# Patient Record
Sex: MALE | Race: WHITE | NOT HISPANIC OR LATINO | Employment: OTHER | URBAN - METROPOLITAN AREA
[De-identification: names, ages, dates, MRNs, and addresses within clinical notes are randomized per-mention and may not be internally consistent; named-entity substitution may affect disease eponyms.]

---

## 2023-02-17 ENCOUNTER — TELEPHONE (OUTPATIENT)
Dept: GASTROENTEROLOGY | Facility: CLINIC | Age: 88
End: 2023-02-17
Payer: MEDICARE

## 2023-02-17 NOTE — TELEPHONE ENCOUNTER
Called PCP's office to see if I could get records on last EGD from 2020 and Colonoscopy 2014 noted on referral. They do not have records. I called pt, no answer, left vm asking for a returned call.

## 2023-02-21 ENCOUNTER — OFFICE VISIT (OUTPATIENT)
Dept: GASTROENTEROLOGY | Facility: CLINIC | Age: 88
End: 2023-02-21
Payer: MEDICARE

## 2023-02-21 VITALS
HEART RATE: 72 BPM | BODY MASS INDEX: 20.05 KG/M2 | SYSTOLIC BLOOD PRESSURE: 111 MMHG | WEIGHT: 156.2 LBS | DIASTOLIC BLOOD PRESSURE: 71 MMHG | HEIGHT: 74 IN

## 2023-02-21 DIAGNOSIS — K22.70 BARRETT'S ESOPHAGUS WITHOUT DYSPLASIA: Primary | ICD-10-CM

## 2023-02-21 DIAGNOSIS — E73.9: ICD-10-CM

## 2023-02-21 PROCEDURE — 99203 OFFICE O/P NEW LOW 30 MIN: CPT | Performed by: NURSE PRACTITIONER

## 2023-02-21 RX ORDER — OMEPRAZOLE 20 MG/1
CAPSULE, DELAYED RELEASE ORAL
COMMUNITY
Start: 2023-02-03

## 2023-02-21 RX ORDER — LATANOPROST 50 UG/ML
SOLUTION/ DROPS OPHTHALMIC
COMMUNITY
Start: 2023-02-11

## 2023-02-21 RX ORDER — KETOCONAZOLE 20 MG/ML
SHAMPOO TOPICAL
COMMUNITY

## 2023-02-21 RX ORDER — TAMSULOSIN HYDROCHLORIDE 0.4 MG/1
CAPSULE ORAL
COMMUNITY

## 2023-02-21 RX ORDER — MAGNESIUM HYDROXIDE 1200 MG/15ML
SUSPENSION ORAL
COMMUNITY
Start: 2022-12-06

## 2023-02-21 RX ORDER — HYDROCODONE BITARTRATE AND ACETAMINOPHEN 7.5; 325 MG/1; MG/1
TABLET ORAL EVERY 6 HOURS
COMMUNITY

## 2023-02-21 RX ORDER — CALCIUM CARBONATE 750 MG/1
750 TABLET, CHEWABLE ORAL DAILY
COMMUNITY

## 2023-02-21 RX ORDER — DULOXETIN HYDROCHLORIDE 30 MG/1
1 CAPSULE, DELAYED RELEASE ORAL DAILY
COMMUNITY
Start: 2023-02-03

## 2023-02-21 RX ORDER — FLUTICASONE PROPIONATE 50 MCG
2 SPRAY, SUSPENSION (ML) NASAL DAILY
COMMUNITY

## 2023-02-21 RX ORDER — MIRTAZAPINE 15 MG/1
15 TABLET, FILM COATED ORAL
COMMUNITY
Start: 2023-02-13

## 2023-02-21 RX ORDER — ACETAMINOPHEN 325 MG/1
650 TABLET ORAL EVERY 6 HOURS PRN
COMMUNITY

## 2023-02-21 NOTE — PROGRESS NOTES
Patient Name: Ish Chavarria   Visit Date: 02/21/2023   Patient ID: [id number]  Provider: SABIHA Fong    Sex: male  Location:  Location Address:  Location Phone: [phone]  [place] [address] [phone]    YOB: 1935  Age: 87 y.o.   Primary Care Provider Akila Florence APRN      Referring Provider: SABIHA Al        Chief Complaint  Pineda's Esophagus  (Last EGD 2020)    History of Present Illness    New pt presents w hx of Pineda's, dx at least 10 yrs ago. No HB w Omeprazole. No dysphagia. Fair appetite, living in assisted living and doesn't always like the meals.  No wt loss.   Pt states he had some diarrhea, attributed to 2 Ensure/d, has also noticed w milk. No blood in stool or black stool. Stools are now back to normal since he stopped Ensure.     Last EGD around 2020 in [place] - 6 cm Pineda's on Opnote on phone of family, no path report available today.  Pt denies any hx of advanced procedures or known dysplasia.  NO ETOH currently, social quit about 1 yr ago.   Past Medical History:   Diagnosis Date   • Pineda's esophagus    • Basal cell carcinoma    • Compression fracture of first lumbar vertebra (HCC) 2022    L1, L2, L5   • Depression    • GERD (gastroesophageal reflux disease)    • Glaucoma    • Hiatal hernia    • History of BPH    • Hyperlipidemia    • Idiopathic peripheral neuropathy 2009   • Nonrheumatic aortic valve stenosis    • Osteoarthritis    • Osteoporosis    • Periodontal disease    • Pneumothorax    • S/P kyphoplasty     L5       Past Surgical History:   Procedure Laterality Date   • APPENDECTOMY  1953   • CATARACT EXTRACTION  2015   • CHOLECYSTECTOMY  2000   • COLONOSCOPY  09/2014   • ENDOSCOPY  01/2020    Pineda's Esophagitis   • TONSILLECTOMY AND ADENOIDECTOMY         Allergies   Allergen Reactions   • Gabapentin Unknown - Low Severity   • Lovastatin Unknown - Low Severity   • Lyrica [Pregabalin] Unknown - Low Severity   • Nsaids GI Intolerance   •  "Paroxetine Unknown - Low Severity       Family History   Problem Relation Age of Onset   • Colon cancer Neg Hx         Social History     Tobacco Use   • Smoking status: Former     Types: Cigarettes     Quit date: 1965     Years since quittin.1   • Smokeless tobacco: Never   Vaping Use   • Vaping Use: Never used   Substance Use Topics   • Alcohol use: Not Currently   • Drug use: Never       Objective     Vital Signs:   /71 (BP Location: Right arm, Patient Position: Sitting, Cuff Size: Adult)   Pulse 72   Ht 188 cm (74\")   Wt 70.9 kg (156 lb 3.2 oz)   BMI 20.05 kg/m²       Physical Exam  Constitutional:       General: The patient is not in acute distress.     Appearance: Normal appearance.   HENT:      Head: Normocephalic and atraumatic.      Nose: Nose normal.   Pulmonary:      Effort: Pulmonary effort is normal. No respiratory distress.   Abdominal:      General: Abdomen is flat. Pt has prominence at end of sternum, pt states he \"has always been that way.\"      Palpations: Abdomen is soft. There is no mass.   Tenderness: There is no abdominal tenderness. There is no guarding.   Musculoskeletal:      Cervical back: Neck supple.      Right lower leg: No edema.      Left lower leg: No edema.   Skin:     General: Skin is warm and dry.   Neurological:      General: No focal deficit present.      Mental Status: The patient is alert and oriented to person, place, and time.      Gait: uses walker  Psychiatric:         Mood and Affect: Mood normal.         Speech: Speech normal.         Behavior: Behavior normal.         Thought Content: Thought content normal.     Result Review :   The following data was reviewed by: SABIHA Fong on 2023:                        Assessment and Plan    Diagnoses and all orders for this visit:    1. Pineda's esophagus without dysplasia (Primary)    2. Late-onset lactose intolerance  Comments:  per reported history              Follow Up   Return if " symptoms worsen or fail to improve.   Discussed with patient that we generally stop screening after age 85 and as he is asymptomatic he was agreeable to this.  We did discuss the long-term risk of PPI therapy but with Pineda's history, recommend he continue.  He is able to be symptom-free with low-dose omeprazole.  I suggested Florajen or VSL #3 probiotic daily  Recommended avoiding lactose containing products, if any loose stools were to return or persist requested he return to the office.  Call or return to clinic PRN if any change in bowel pattern, blood in stool, or new GI sx.       Patient was given instructions and counseling regarding his condition or for health maintenance advice. Please see specific information pulled into the AVS if appropriate.

## 2023-12-24 ENCOUNTER — HOSPITAL ENCOUNTER (EMERGENCY)
Facility: HOSPITAL | Age: 88
Discharge: HOME | End: 2023-12-24
Attending: EMERGENCY MEDICINE | Admitting: EMERGENCY MEDICINE
Payer: MEDICARE

## 2023-12-24 ENCOUNTER — APPOINTMENT (OUTPATIENT)
Dept: GENERAL RADIOLOGY | Facility: HOSPITAL | Age: 88
End: 2023-12-24
Payer: MEDICARE

## 2023-12-24 VITALS
WEIGHT: 175.71 LBS | BODY MASS INDEX: 22.55 KG/M2 | SYSTOLIC BLOOD PRESSURE: 132 MMHG | TEMPERATURE: 97.8 F | DIASTOLIC BLOOD PRESSURE: 88 MMHG | RESPIRATION RATE: 16 BRPM | OXYGEN SATURATION: 96 % | HEART RATE: 60 BPM | HEIGHT: 74 IN

## 2023-12-24 DIAGNOSIS — K21.00 GASTROESOPHAGEAL REFLUX DISEASE WITH ESOPHAGITIS WITHOUT HEMORRHAGE: Primary | ICD-10-CM

## 2023-12-24 LAB
ALBUMIN SERPL-MCNC: 4.4 G/DL (ref 3.5–5.2)
ALBUMIN/GLOB SERPL: 1.2 G/DL
ALP SERPL-CCNC: 95 U/L (ref 39–117)
ALT SERPL W P-5'-P-CCNC: 33 U/L (ref 1–41)
ANION GAP SERPL CALCULATED.3IONS-SCNC: 10.6 MMOL/L (ref 5–15)
AST SERPL-CCNC: 24 U/L (ref 1–40)
BASOPHILS # BLD AUTO: 0.06 10*3/MM3 (ref 0–0.2)
BASOPHILS NFR BLD AUTO: 0.7 % (ref 0–1.5)
BILIRUB SERPL-MCNC: 0.7 MG/DL (ref 0–1.2)
BUN SERPL-MCNC: 14 MG/DL (ref 8–23)
BUN/CREAT SERPL: 12.5 (ref 7–25)
CALCIUM SPEC-SCNC: 9.5 MG/DL (ref 8.6–10.5)
CHLORIDE SERPL-SCNC: 100 MMOL/L (ref 98–107)
CO2 SERPL-SCNC: 25.4 MMOL/L (ref 22–29)
CREAT SERPL-MCNC: 1.12 MG/DL (ref 0.76–1.27)
D DIMER PPP FEU-MCNC: 0.76 MCGFEU/ML (ref 0–0.88)
DEPRECATED RDW RBC AUTO: 45.7 FL (ref 37–54)
EGFRCR SERPLBLD CKD-EPI 2021: 63.2 ML/MIN/1.73
EOSINOPHIL # BLD AUTO: 0.11 10*3/MM3 (ref 0–0.4)
EOSINOPHIL NFR BLD AUTO: 1.2 % (ref 0.3–6.2)
ERYTHROCYTE [DISTWIDTH] IN BLOOD BY AUTOMATED COUNT: 12.8 % (ref 12.3–15.4)
FLUAV SUBTYP SPEC NAA+PROBE: NOT DETECTED
FLUBV RNA ISLT QL NAA+PROBE: NOT DETECTED
GEN 5 2HR TROPONIN T REFLEX: 11 NG/L
GLOBULIN UR ELPH-MCNC: 3.6 GM/DL
GLUCOSE SERPL-MCNC: 118 MG/DL (ref 65–99)
HCT VFR BLD AUTO: 46.1 % (ref 37.5–51)
HGB BLD-MCNC: 15.3 G/DL (ref 13–17.7)
HOLD SPECIMEN: NORMAL
HOLD SPECIMEN: NORMAL
IMM GRANULOCYTES # BLD AUTO: 0.03 10*3/MM3 (ref 0–0.05)
IMM GRANULOCYTES NFR BLD AUTO: 0.3 % (ref 0–0.5)
LIPASE SERPL-CCNC: 27 U/L (ref 13–60)
LYMPHOCYTES # BLD AUTO: 2.02 10*3/MM3 (ref 0.7–3.1)
LYMPHOCYTES NFR BLD AUTO: 22.9 % (ref 19.6–45.3)
MAGNESIUM SERPL-MCNC: 2.3 MG/DL (ref 1.6–2.4)
MCH RBC QN AUTO: 32.1 PG (ref 26.6–33)
MCHC RBC AUTO-ENTMCNC: 33.2 G/DL (ref 31.5–35.7)
MCV RBC AUTO: 96.6 FL (ref 79–97)
MONOCYTES # BLD AUTO: 0.52 10*3/MM3 (ref 0.1–0.9)
MONOCYTES NFR BLD AUTO: 5.9 % (ref 5–12)
NEUTROPHILS NFR BLD AUTO: 6.08 10*3/MM3 (ref 1.7–7)
NEUTROPHILS NFR BLD AUTO: 69 % (ref 42.7–76)
NRBC BLD AUTO-RTO: 0 /100 WBC (ref 0–0.2)
NT-PROBNP SERPL-MCNC: 79 PG/ML (ref 0–1800)
PLATELET # BLD AUTO: 222 10*3/MM3 (ref 140–450)
PMV BLD AUTO: 9.7 FL (ref 6–12)
POTASSIUM SERPL-SCNC: 4.4 MMOL/L (ref 3.5–5.2)
PROT SERPL-MCNC: 8 G/DL (ref 6–8.5)
QT INTERVAL: 384 MS
QT INTERVAL: 384 MS
QTC INTERVAL: 392 MS
QTC INTERVAL: 420 MS
RBC # BLD AUTO: 4.77 10*6/MM3 (ref 4.14–5.8)
RSV RNA NPH QL NAA+NON-PROBE: NOT DETECTED
SARS-COV-2 RNA RESP QL NAA+PROBE: DETECTED
SODIUM SERPL-SCNC: 136 MMOL/L (ref 136–145)
TROPONIN T DELTA: 0 NG/L
TROPONIN T SERPL HS-MCNC: 11 NG/L
WBC NRBC COR # BLD AUTO: 8.82 10*3/MM3 (ref 3.4–10.8)
WHOLE BLOOD HOLD COAG: NORMAL
WHOLE BLOOD HOLD SPECIMEN: NORMAL

## 2023-12-24 PROCEDURE — 99284 EMERGENCY DEPT VISIT MOD MDM: CPT

## 2023-12-24 PROCEDURE — 93005 ELECTROCARDIOGRAM TRACING: CPT | Performed by: EMERGENCY MEDICINE

## 2023-12-24 PROCEDURE — 93005 ELECTROCARDIOGRAM TRACING: CPT

## 2023-12-24 PROCEDURE — 87637 SARSCOV2&INF A&B&RSV AMP PRB: CPT | Performed by: EMERGENCY MEDICINE

## 2023-12-24 PROCEDURE — 83880 ASSAY OF NATRIURETIC PEPTIDE: CPT | Performed by: EMERGENCY MEDICINE

## 2023-12-24 PROCEDURE — 84484 ASSAY OF TROPONIN QUANT: CPT | Performed by: EMERGENCY MEDICINE

## 2023-12-24 PROCEDURE — 36415 COLL VENOUS BLD VENIPUNCTURE: CPT

## 2023-12-24 PROCEDURE — 96374 THER/PROPH/DIAG INJ IV PUSH: CPT

## 2023-12-24 PROCEDURE — 83690 ASSAY OF LIPASE: CPT | Performed by: EMERGENCY MEDICINE

## 2023-12-24 PROCEDURE — 83735 ASSAY OF MAGNESIUM: CPT | Performed by: EMERGENCY MEDICINE

## 2023-12-24 PROCEDURE — 71045 X-RAY EXAM CHEST 1 VIEW: CPT

## 2023-12-24 PROCEDURE — 80053 COMPREHEN METABOLIC PANEL: CPT | Performed by: EMERGENCY MEDICINE

## 2023-12-24 PROCEDURE — 85025 COMPLETE CBC W/AUTO DIFF WBC: CPT | Performed by: EMERGENCY MEDICINE

## 2023-12-24 PROCEDURE — 85379 FIBRIN DEGRADATION QUANT: CPT | Performed by: EMERGENCY MEDICINE

## 2023-12-24 RX ORDER — SODIUM CHLORIDE 0.9 % (FLUSH) 0.9 %
10 SYRINGE (ML) INJECTION AS NEEDED
Status: DISCONTINUED | OUTPATIENT
Start: 2023-12-24 | End: 2023-12-24 | Stop reason: HOSPADM

## 2023-12-24 RX ORDER — ASPIRIN 81 MG/1
324 TABLET, CHEWABLE ORAL ONCE
Status: DISCONTINUED | OUTPATIENT
Start: 2023-12-24 | End: 2023-12-24 | Stop reason: HOSPADM

## 2023-12-24 RX ORDER — PANTOPRAZOLE SODIUM 40 MG/10ML
40 INJECTION, POWDER, LYOPHILIZED, FOR SOLUTION INTRAVENOUS
Status: DISCONTINUED | OUTPATIENT
Start: 2023-12-25 | End: 2023-12-24

## 2023-12-24 RX ORDER — PANTOPRAZOLE SODIUM 40 MG/10ML
40 INJECTION, POWDER, LYOPHILIZED, FOR SOLUTION INTRAVENOUS ONCE
Status: COMPLETED | OUTPATIENT
Start: 2023-12-24 | End: 2023-12-24

## 2023-12-24 RX ORDER — DULOXETIN HYDROCHLORIDE 60 MG/1
60 CAPSULE, DELAYED RELEASE ORAL DAILY
COMMUNITY

## 2023-12-24 RX ADMIN — PANTOPRAZOLE SODIUM 40 MG: 40 INJECTION, POWDER, FOR SOLUTION INTRAVENOUS at 12:50

## 2023-12-24 NOTE — DISCHARGE INSTRUCTIONS
Increase your omeprazole to twice daily.  Avoid caffeine after noon.  If tolerable sleep with the head of the bed slightly elevated.  Return for worsening chest pain or other concerns.

## 2024-01-01 ENCOUNTER — APPOINTMENT (OUTPATIENT)
Dept: GENERAL RADIOLOGY | Facility: HOSPITAL | Age: 89
DRG: 177 | End: 2024-01-01
Payer: MEDICARE

## 2024-01-01 ENCOUNTER — HOSPITAL ENCOUNTER (INPATIENT)
Facility: HOSPITAL | Age: 89
LOS: 2 days | DRG: 177 | End: 2024-03-10
Attending: EMERGENCY MEDICINE | Admitting: INTERNAL MEDICINE
Payer: MEDICARE

## 2024-01-01 VITALS
RESPIRATION RATE: 30 BRPM | SYSTOLIC BLOOD PRESSURE: 82 MMHG | TEMPERATURE: 99.3 F | HEART RATE: 97 BPM | BODY MASS INDEX: 21.79 KG/M2 | DIASTOLIC BLOOD PRESSURE: 57 MMHG | WEIGHT: 169.75 LBS | OXYGEN SATURATION: 63 % | HEIGHT: 74 IN

## 2024-01-01 DIAGNOSIS — J69.0 ASPIRATION PNEUMONIA OF RIGHT LUNG, UNSPECIFIED ASPIRATION PNEUMONIA TYPE, UNSPECIFIED PART OF LUNG: ICD-10-CM

## 2024-01-01 DIAGNOSIS — E86.0 DEHYDRATION: Primary | ICD-10-CM

## 2024-01-01 LAB
ALBUMIN SERPL-MCNC: 3.1 G/DL (ref 3.5–5.2)
ALBUMIN/GLOB SERPL: 0.9 G/DL
ALP SERPL-CCNC: 99 U/L (ref 39–117)
ALT SERPL W P-5'-P-CCNC: 44 U/L (ref 1–41)
ANION GAP SERPL CALCULATED.3IONS-SCNC: 11.4 MMOL/L (ref 5–15)
AST SERPL-CCNC: 54 U/L (ref 1–40)
BACTERIA UR QL AUTO: ABNORMAL /HPF
BASOPHILS # BLD AUTO: 0.04 10*3/MM3 (ref 0–0.2)
BASOPHILS NFR BLD AUTO: 0.3 % (ref 0–1.5)
BILIRUB SERPL-MCNC: 2 MG/DL (ref 0–1.2)
BILIRUB UR QL STRIP: ABNORMAL
BUN SERPL-MCNC: 27 MG/DL (ref 8–23)
BUN/CREAT SERPL: 33.8 (ref 7–25)
CALCIUM SPEC-SCNC: 8.4 MG/DL (ref 8.6–10.5)
CHLORIDE SERPL-SCNC: 110 MMOL/L (ref 98–107)
CLARITY UR: CLEAR
CO2 SERPL-SCNC: 22.6 MMOL/L (ref 22–29)
COLOR UR: ABNORMAL
CREAT SERPL-MCNC: 0.8 MG/DL (ref 0.76–1.27)
D-LACTATE SERPL-SCNC: 1.3 MMOL/L (ref 0.5–2)
DEPRECATED RDW RBC AUTO: 50.6 FL (ref 37–54)
EGFRCR SERPLBLD CKD-EPI 2021: 85.1 ML/MIN/1.73
EOSINOPHIL # BLD AUTO: 0.02 10*3/MM3 (ref 0–0.4)
EOSINOPHIL NFR BLD AUTO: 0.1 % (ref 0.3–6.2)
ERYTHROCYTE [DISTWIDTH] IN BLOOD BY AUTOMATED COUNT: 14 % (ref 12.3–15.4)
GLOBULIN UR ELPH-MCNC: 3.6 GM/DL
GLUCOSE SERPL-MCNC: 117 MG/DL (ref 65–99)
GLUCOSE UR STRIP-MCNC: NEGATIVE MG/DL
HCT VFR BLD AUTO: 32.1 % (ref 37.5–51)
HGB BLD-MCNC: 10.5 G/DL (ref 13–17.7)
HGB UR QL STRIP.AUTO: NEGATIVE
HYALINE CASTS UR QL AUTO: ABNORMAL /LPF
IMM GRANULOCYTES # BLD AUTO: 0.14 10*3/MM3 (ref 0–0.05)
IMM GRANULOCYTES NFR BLD AUTO: 1 % (ref 0–0.5)
KETONES UR QL STRIP: ABNORMAL
LEUKOCYTE ESTERASE UR QL STRIP.AUTO: ABNORMAL
LYMPHOCYTES # BLD AUTO: 1.38 10*3/MM3 (ref 0.7–3.1)
LYMPHOCYTES NFR BLD AUTO: 10.1 % (ref 19.6–45.3)
MCH RBC QN AUTO: 32.4 PG (ref 26.6–33)
MCHC RBC AUTO-ENTMCNC: 32.7 G/DL (ref 31.5–35.7)
MCV RBC AUTO: 99.1 FL (ref 79–97)
MONOCYTES # BLD AUTO: 1.04 10*3/MM3 (ref 0.1–0.9)
MONOCYTES NFR BLD AUTO: 7.6 % (ref 5–12)
MRSA DNA SPEC QL NAA+PROBE: ABNORMAL
NEUTROPHILS NFR BLD AUTO: 11.09 10*3/MM3 (ref 1.7–7)
NEUTROPHILS NFR BLD AUTO: 80.9 % (ref 42.7–76)
NITRITE UR QL STRIP: NEGATIVE
NRBC BLD AUTO-RTO: 0 /100 WBC (ref 0–0.2)
PH UR STRIP.AUTO: 6 [PH] (ref 5–8)
PLATELET # BLD AUTO: 346 10*3/MM3 (ref 140–450)
PMV BLD AUTO: 9.7 FL (ref 6–12)
POTASSIUM SERPL-SCNC: 3.5 MMOL/L (ref 3.5–5.2)
PROCALCITONIN SERPL-MCNC: 0.2 NG/ML (ref 0–0.25)
PROT SERPL-MCNC: 6.7 G/DL (ref 6–8.5)
PROT UR QL STRIP: ABNORMAL
RBC # BLD AUTO: 3.24 10*6/MM3 (ref 4.14–5.8)
RBC # UR STRIP: ABNORMAL /HPF
REF LAB TEST METHOD: ABNORMAL
SODIUM SERPL-SCNC: 144 MMOL/L (ref 136–145)
SP GR UR STRIP: 1.03 (ref 1–1.03)
SQUAMOUS #/AREA URNS HPF: ABNORMAL /HPF
UROBILINOGEN UR QL STRIP: ABNORMAL
WBC # UR STRIP: ABNORMAL /HPF
WBC NRBC COR # BLD AUTO: 13.71 10*3/MM3 (ref 3.4–10.8)

## 2024-01-01 PROCEDURE — 99232 SBSQ HOSP IP/OBS MODERATE 35: CPT | Performed by: INTERNAL MEDICINE

## 2024-01-01 PROCEDURE — 84145 PROCALCITONIN (PCT): CPT | Performed by: INTERNAL MEDICINE

## 2024-01-01 PROCEDURE — 99285 EMERGENCY DEPT VISIT HI MDM: CPT

## 2024-01-01 PROCEDURE — 71045 X-RAY EXAM CHEST 1 VIEW: CPT

## 2024-01-01 PROCEDURE — 25010000002 VANCOMYCIN 5 G RECONSTITUTED SOLUTION: Performed by: EMERGENCY MEDICINE

## 2024-01-01 PROCEDURE — 93005 ELECTROCARDIOGRAM TRACING: CPT | Performed by: INTERNAL MEDICINE

## 2024-01-01 PROCEDURE — 25010000002 HALOPERIDOL LACTATE PER 5 MG: Performed by: PHYSICIAN ASSISTANT

## 2024-01-01 PROCEDURE — 36415 COLL VENOUS BLD VENIPUNCTURE: CPT | Performed by: EMERGENCY MEDICINE

## 2024-01-01 PROCEDURE — 85025 COMPLETE CBC W/AUTO DIFF WBC: CPT | Performed by: EMERGENCY MEDICINE

## 2024-01-01 PROCEDURE — 73502 X-RAY EXAM HIP UNI 2-3 VIEWS: CPT

## 2024-01-01 PROCEDURE — 25010000002 AMPICILLIN-SULBACTAM PER 1.5 G: Performed by: INTERNAL MEDICINE

## 2024-01-01 PROCEDURE — 25010000002 DIAZEPAM PER 5 MG: Performed by: PHYSICIAN ASSISTANT

## 2024-01-01 PROCEDURE — 25010000002 MORPHINE PER 10 MG: Performed by: STUDENT IN AN ORGANIZED HEALTH CARE EDUCATION/TRAINING PROGRAM

## 2024-01-01 PROCEDURE — 83605 ASSAY OF LACTIC ACID: CPT | Performed by: EMERGENCY MEDICINE

## 2024-01-01 PROCEDURE — 25010000002 ZIPRASIDONE MESYLATE PER 10 MG: Performed by: INTERNAL MEDICINE

## 2024-01-01 PROCEDURE — 25810000003 SODIUM CHLORIDE 0.9 % SOLUTION: Performed by: EMERGENCY MEDICINE

## 2024-01-01 PROCEDURE — 99222 1ST HOSP IP/OBS MODERATE 55: CPT | Performed by: INTERNAL MEDICINE

## 2024-01-01 PROCEDURE — 25010000002 HALOPERIDOL LACTATE PER 5 MG: Performed by: INTERNAL MEDICINE

## 2024-01-01 PROCEDURE — 87040 BLOOD CULTURE FOR BACTERIA: CPT | Performed by: EMERGENCY MEDICINE

## 2024-01-01 PROCEDURE — 25010000002 ENOXAPARIN PER 10 MG: Performed by: INTERNAL MEDICINE

## 2024-01-01 PROCEDURE — 93010 ELECTROCARDIOGRAM REPORT: CPT | Performed by: INTERNAL MEDICINE

## 2024-01-01 PROCEDURE — 80053 COMPREHEN METABOLIC PANEL: CPT | Performed by: EMERGENCY MEDICINE

## 2024-01-01 PROCEDURE — 25810000003 LACTATED RINGERS PER 1000 ML: Performed by: INTERNAL MEDICINE

## 2024-01-01 PROCEDURE — 87641 MR-STAPH DNA AMP PROBE: CPT | Performed by: INTERNAL MEDICINE

## 2024-01-01 PROCEDURE — 81001 URINALYSIS AUTO W/SCOPE: CPT | Performed by: EMERGENCY MEDICINE

## 2024-01-01 RX ORDER — MULTIPLE VITAMINS W/ MINERALS TAB 9MG-400MCG
1 TAB ORAL DAILY
COMMUNITY

## 2024-01-01 RX ORDER — ATROPINE SULFATE 10 MG/ML
5 SOLUTION/ DROPS OPHTHALMIC
Status: DISCONTINUED | OUTPATIENT
Start: 2024-01-01 | End: 2024-01-01 | Stop reason: HOSPADM

## 2024-01-01 RX ORDER — VANCOMYCIN/0.9 % SOD CHLORIDE 1.5G/250ML
20 PLASTIC BAG, INJECTION (ML) INTRAVENOUS ONCE
Status: COMPLETED | OUTPATIENT
Start: 2024-01-01 | End: 2024-01-01

## 2024-01-01 RX ORDER — PANTOPRAZOLE SODIUM 40 MG/1
40 TABLET, DELAYED RELEASE ORAL
COMMUNITY

## 2024-01-01 RX ORDER — LORAZEPAM 0.5 MG/1
1 TABLET ORAL
Status: DISCONTINUED | OUTPATIENT
Start: 2024-01-01 | End: 2024-01-01 | Stop reason: HOSPADM

## 2024-01-01 RX ORDER — HALOPERIDOL 5 MG/ML
0.5 INJECTION INTRAMUSCULAR EVERY 6 HOURS PRN
Status: DISCONTINUED | OUTPATIENT
Start: 2024-01-01 | End: 2024-01-01 | Stop reason: HOSPADM

## 2024-01-01 RX ORDER — HALOPERIDOL 2 MG/ML
0.5 SOLUTION ORAL
Status: DISCONTINUED | OUTPATIENT
Start: 2024-01-01 | End: 2024-01-01 | Stop reason: HOSPADM

## 2024-01-01 RX ORDER — LORAZEPAM 2 MG/ML
1 CONCENTRATE ORAL
Status: DISCONTINUED | OUTPATIENT
Start: 2024-01-01 | End: 2024-01-01 | Stop reason: HOSPADM

## 2024-01-01 RX ORDER — TERIPARATIDE 250 UG/ML
20 INJECTION, SOLUTION SUBCUTANEOUS DAILY
COMMUNITY
Start: 2024-01-01

## 2024-01-01 RX ORDER — DIAZEPAM 5 MG/ML
5 INJECTION, SOLUTION INTRAMUSCULAR; INTRAVENOUS
Status: DISCONTINUED | OUTPATIENT
Start: 2024-01-01 | End: 2024-01-01 | Stop reason: HOSPADM

## 2024-01-01 RX ORDER — ACETAMINOPHEN 325 MG/1
650 TABLET ORAL EVERY 4 HOURS PRN
Status: DISCONTINUED | OUTPATIENT
Start: 2024-01-01 | End: 2024-01-01 | Stop reason: HOSPADM

## 2024-01-01 RX ORDER — ENOXAPARIN SODIUM 100 MG/ML
40 INJECTION SUBCUTANEOUS DAILY
Status: DISCONTINUED | OUTPATIENT
Start: 2024-01-01 | End: 2024-01-01

## 2024-01-01 RX ORDER — MORPHINE SULFATE 2 MG/ML
1 INJECTION, SOLUTION INTRAMUSCULAR; INTRAVENOUS
Status: DISCONTINUED | OUTPATIENT
Start: 2024-01-01 | End: 2024-01-01 | Stop reason: HOSPADM

## 2024-01-01 RX ORDER — HALOPERIDOL 5 MG/ML
0.5 INJECTION INTRAMUSCULAR
Status: DISCONTINUED | OUTPATIENT
Start: 2024-01-01 | End: 2024-01-01 | Stop reason: HOSPADM

## 2024-01-01 RX ORDER — TRAMADOL HYDROCHLORIDE 50 MG/1
50 TABLET ORAL EVERY 6 HOURS PRN
COMMUNITY

## 2024-01-01 RX ORDER — SODIUM CHLORIDE 0.9 % (FLUSH) 0.9 %
10 SYRINGE (ML) INJECTION AS NEEDED
Status: DISCONTINUED | OUTPATIENT
Start: 2024-01-01 | End: 2024-01-01 | Stop reason: HOSPADM

## 2024-01-01 RX ORDER — SODIUM CHLORIDE 9 MG/ML
40 INJECTION, SOLUTION INTRAVENOUS AS NEEDED
Status: DISCONTINUED | OUTPATIENT
Start: 2024-01-01 | End: 2024-01-01 | Stop reason: HOSPADM

## 2024-01-01 RX ORDER — QUETIAPINE FUMARATE 25 MG/1
25 TABLET, FILM COATED ORAL NIGHTLY
Status: DISCONTINUED | OUTPATIENT
Start: 2024-01-01 | End: 2024-01-01 | Stop reason: HOSPADM

## 2024-01-01 RX ORDER — SODIUM CHLORIDE 0.9 % (FLUSH) 0.9 %
10 SYRINGE (ML) INJECTION EVERY 12 HOURS SCHEDULED
Status: DISCONTINUED | OUTPATIENT
Start: 2024-01-01 | End: 2024-01-01

## 2024-01-01 RX ORDER — ENOXAPARIN SODIUM 100 MG/ML
40 INJECTION SUBCUTANEOUS DAILY
COMMUNITY

## 2024-01-01 RX ORDER — SODIUM CHLORIDE, SODIUM LACTATE, POTASSIUM CHLORIDE, CALCIUM CHLORIDE 600; 310; 30; 20 MG/100ML; MG/100ML; MG/100ML; MG/100ML
100 INJECTION, SOLUTION INTRAVENOUS CONTINUOUS
Status: DISCONTINUED | OUTPATIENT
Start: 2024-01-01 | End: 2024-01-01

## 2024-01-01 RX ORDER — HALOPERIDOL 0.5 MG/1
0.5 TABLET ORAL
Status: DISCONTINUED | OUTPATIENT
Start: 2024-01-01 | End: 2024-01-01 | Stop reason: HOSPADM

## 2024-01-01 RX ORDER — AMOXICILLIN 250 MG
1 CAPSULE ORAL 2 TIMES DAILY
COMMUNITY

## 2024-01-01 RX ORDER — ONDANSETRON 2 MG/ML
4 INJECTION INTRAMUSCULAR; INTRAVENOUS EVERY 6 HOURS PRN
Status: DISCONTINUED | OUTPATIENT
Start: 2024-01-01 | End: 2024-01-01 | Stop reason: HOSPADM

## 2024-01-01 RX ORDER — ZIPRASIDONE MESYLATE 20 MG/ML
10 INJECTION, POWDER, LYOPHILIZED, FOR SOLUTION INTRAMUSCULAR ONCE
Status: COMPLETED | OUTPATIENT
Start: 2024-01-01 | End: 2024-01-01

## 2024-01-01 RX ORDER — MORPHINE SULFATE 10 MG/.5ML
5 SOLUTION ORAL
Status: DISCONTINUED | OUTPATIENT
Start: 2024-01-01 | End: 2024-01-01 | Stop reason: HOSPADM

## 2024-01-01 RX ADMIN — VANCOMYCIN HYDROCHLORIDE 1500 MG: 5 INJECTION, POWDER, LYOPHILIZED, FOR SOLUTION INTRAVENOUS at 13:43

## 2024-01-01 RX ADMIN — DIAZEPAM 5 MG: 10 INJECTION, SOLUTION INTRAMUSCULAR; INTRAVENOUS at 03:08

## 2024-01-01 RX ADMIN — MORPHINE SULFATE 1 MG: 2 INJECTION, SOLUTION INTRAMUSCULAR; INTRAVENOUS at 12:56

## 2024-01-01 RX ADMIN — MORPHINE SULFATE 5 MG: 20 SOLUTION ORAL at 23:08

## 2024-01-01 RX ADMIN — ATROPINE SULFATE 5 DROP: 10 SOLUTION/ DROPS OPHTHALMIC at 14:04

## 2024-01-01 RX ADMIN — LORAZEPAM 1 MG: 2 SOLUTION, CONCENTRATE ORAL at 23:45

## 2024-01-01 RX ADMIN — MORPHINE SULFATE 5 MG: 20 SOLUTION ORAL at 01:48

## 2024-01-01 RX ADMIN — HALOPERIDOL LACTATE 0.5 MG: 5 INJECTION, SOLUTION INTRAMUSCULAR at 17:18

## 2024-01-01 RX ADMIN — MORPHINE SULFATE 1 MG: 2 INJECTION, SOLUTION INTRAMUSCULAR; INTRAVENOUS at 05:07

## 2024-01-01 RX ADMIN — LORAZEPAM 1 MG: 2 SOLUTION, CONCENTRATE ORAL at 16:24

## 2024-01-01 RX ADMIN — MORPHINE SULFATE 1 MG: 2 INJECTION, SOLUTION INTRAMUSCULAR; INTRAVENOUS at 11:32

## 2024-01-01 RX ADMIN — MORPHINE SULFATE 5 MG: 20 SOLUTION ORAL at 00:03

## 2024-01-01 RX ADMIN — MORPHINE SULFATE 5 MG: 20 SOLUTION ORAL at 21:58

## 2024-01-01 RX ADMIN — MORPHINE SULFATE 1 MG: 2 INJECTION, SOLUTION INTRAMUSCULAR; INTRAVENOUS at 01:05

## 2024-01-01 RX ADMIN — Medication 10 ML: at 10:17

## 2024-01-01 RX ADMIN — MORPHINE SULFATE 1 MG: 2 INJECTION, SOLUTION INTRAMUSCULAR; INTRAVENOUS at 06:51

## 2024-01-01 RX ADMIN — MORPHINE SULFATE 5 MG: 20 SOLUTION ORAL at 21:15

## 2024-01-01 RX ADMIN — LORAZEPAM 1 MG: 2 SOLUTION, CONCENTRATE ORAL at 23:08

## 2024-01-01 RX ADMIN — LORAZEPAM 1 MG: 2 SOLUTION, CONCENTRATE ORAL at 10:16

## 2024-01-01 RX ADMIN — MORPHINE SULFATE 5 MG: 20 SOLUTION ORAL at 12:05

## 2024-01-01 RX ADMIN — MORPHINE SULFATE 5 MG: 20 SOLUTION ORAL at 09:03

## 2024-01-01 RX ADMIN — LORAZEPAM 1 MG: 2 SOLUTION, CONCENTRATE ORAL at 19:52

## 2024-01-01 RX ADMIN — LORAZEPAM 1 MG: 2 SOLUTION, CONCENTRATE ORAL at 03:52

## 2024-01-01 RX ADMIN — ATROPINE SULFATE 5 DROP: 10 SOLUTION/ DROPS OPHTHALMIC at 07:36

## 2024-01-01 RX ADMIN — SODIUM CHLORIDE, POTASSIUM CHLORIDE, SODIUM LACTATE AND CALCIUM CHLORIDE 100 ML/HR: 600; 310; 30; 20 INJECTION, SOLUTION INTRAVENOUS at 17:08

## 2024-01-01 RX ADMIN — LORAZEPAM 1 MG: 2 SOLUTION, CONCENTRATE ORAL at 00:03

## 2024-01-01 RX ADMIN — MORPHINE SULFATE 5 MG: 20 SOLUTION ORAL at 03:51

## 2024-01-01 RX ADMIN — SODIUM CHLORIDE 1000 ML: 9 INJECTION, SOLUTION INTRAVENOUS at 09:51

## 2024-01-01 RX ADMIN — HALOPERIDOL LACTATE 0.5 MG: 5 INJECTION, SOLUTION INTRAMUSCULAR at 23:19

## 2024-01-01 RX ADMIN — ENOXAPARIN SODIUM 40 MG: 100 INJECTION SUBCUTANEOUS at 15:40

## 2024-01-01 RX ADMIN — MORPHINE SULFATE 5 MG: 20 SOLUTION ORAL at 16:21

## 2024-01-01 RX ADMIN — MORPHINE SULFATE 5 MG: 20 SOLUTION ORAL at 19:52

## 2024-01-01 RX ADMIN — MORPHINE SULFATE 1 MG: 2 INJECTION, SOLUTION INTRAMUSCULAR; INTRAVENOUS at 03:08

## 2024-01-01 RX ADMIN — MORPHINE SULFATE 5 MG: 20 SOLUTION ORAL at 23:45

## 2024-01-01 RX ADMIN — Medication 10 ML: at 15:41

## 2024-01-01 RX ADMIN — LORAZEPAM 1 MG: 2 SOLUTION, CONCENTRATE ORAL at 12:56

## 2024-01-01 RX ADMIN — ATROPINE SULFATE 5 DROP: 10 SOLUTION/ DROPS OPHTHALMIC at 12:04

## 2024-01-01 RX ADMIN — LORAZEPAM 1 MG: 2 SOLUTION, CONCENTRATE ORAL at 21:15

## 2024-01-01 RX ADMIN — ATROPINE SULFATE 5 DROP: 10 SOLUTION/ DROPS OPHTHALMIC at 09:03

## 2024-01-01 RX ADMIN — MORPHINE SULFATE 1 MG: 2 INJECTION, SOLUTION INTRAMUSCULAR; INTRAVENOUS at 07:54

## 2024-01-01 RX ADMIN — DIAZEPAM 5 MG: 10 INJECTION, SOLUTION INTRAMUSCULAR; INTRAVENOUS at 05:07

## 2024-01-01 RX ADMIN — LORAZEPAM 1 MG: 2 SOLUTION, CONCENTRATE ORAL at 21:58

## 2024-01-01 RX ADMIN — LORAZEPAM 1 MG: 2 SOLUTION, CONCENTRATE ORAL at 06:25

## 2024-01-01 RX ADMIN — LORAZEPAM 1 MG: 2 SOLUTION, CONCENTRATE ORAL at 22:35

## 2024-01-01 RX ADMIN — AMPICILLIN AND SULBACTAM 3 G: 2; 1 INJECTION, POWDER, FOR SOLUTION INTRAVENOUS at 15:40

## 2024-01-01 RX ADMIN — DIAZEPAM 5 MG: 10 INJECTION, SOLUTION INTRAMUSCULAR; INTRAVENOUS at 01:05

## 2024-01-01 RX ADMIN — MORPHINE SULFATE 5 MG: 20 SOLUTION ORAL at 06:25

## 2024-01-01 RX ADMIN — MORPHINE SULFATE 5 MG: 20 SOLUTION ORAL at 18:15

## 2024-01-01 RX ADMIN — DIAZEPAM 5 MG: 10 INJECTION, SOLUTION INTRAMUSCULAR; INTRAVENOUS at 06:51

## 2024-01-01 RX ADMIN — MORPHINE SULFATE 1 MG: 2 INJECTION, SOLUTION INTRAMUSCULAR; INTRAVENOUS at 10:17

## 2024-01-01 RX ADMIN — MORPHINE SULFATE 5 MG: 20 SOLUTION ORAL at 14:04

## 2024-01-01 RX ADMIN — MORPHINE SULFATE 5 MG: 20 SOLUTION ORAL at 22:36

## 2024-01-01 RX ADMIN — ZIPRASIDONE MESYLATE 10 MG: 20 INJECTION, POWDER, LYOPHILIZED, FOR SOLUTION INTRAMUSCULAR at 18:36

## 2024-01-01 RX ADMIN — LORAZEPAM 1 MG: 2 SOLUTION, CONCENTRATE ORAL at 01:48

## 2024-01-01 RX ADMIN — Medication 10 ML: at 12:56

## 2024-01-01 RX ADMIN — Medication 10 ML: at 11:32

## 2024-01-03 LAB
QT INTERVAL: 384 MS
QT INTERVAL: 384 MS
QTC INTERVAL: 392 MS
QTC INTERVAL: 420 MS

## 2024-02-29 ENCOUNTER — HOSPITAL ENCOUNTER (EMERGENCY)
Facility: HOSPITAL | Age: 89
Discharge: OTHER INSTITUTION | End: 2024-02-29
Attending: EMERGENCY MEDICINE
Payer: MEDICARE

## 2024-02-29 ENCOUNTER — APPOINTMENT (OUTPATIENT)
Dept: GENERAL RADIOLOGY | Facility: HOSPITAL | Age: 89
End: 2024-02-29
Payer: MEDICARE

## 2024-02-29 ENCOUNTER — APPOINTMENT (OUTPATIENT)
Dept: CT IMAGING | Facility: HOSPITAL | Age: 89
End: 2024-02-29
Payer: MEDICARE

## 2024-02-29 VITALS
TEMPERATURE: 98.7 F | HEART RATE: 85 BPM | HEIGHT: 74 IN | BODY MASS INDEX: 23.17 KG/M2 | SYSTOLIC BLOOD PRESSURE: 135 MMHG | DIASTOLIC BLOOD PRESSURE: 88 MMHG | WEIGHT: 180.56 LBS | OXYGEN SATURATION: 95 % | RESPIRATION RATE: 20 BRPM

## 2024-02-29 DIAGNOSIS — S32.401A CLOSED DISPLACED FRACTURE OF RIGHT ACETABULUM, UNSPECIFIED PORTION OF ACETABULUM, INITIAL ENCOUNTER: Primary | ICD-10-CM

## 2024-02-29 PROCEDURE — 96374 THER/PROPH/DIAG INJ IV PUSH: CPT

## 2024-02-29 PROCEDURE — 25010000002 ONDANSETRON PER 1 MG: Performed by: EMERGENCY MEDICINE

## 2024-02-29 PROCEDURE — 96375 TX/PRO/DX INJ NEW DRUG ADDON: CPT

## 2024-02-29 PROCEDURE — 73080 X-RAY EXAM OF ELBOW: CPT

## 2024-02-29 PROCEDURE — 25010000002 HYDROMORPHONE 1 MG/ML SOLUTION: Performed by: EMERGENCY MEDICINE

## 2024-02-29 PROCEDURE — 25010000002 KETOROLAC TROMETHAMINE PER 15 MG: Performed by: EMERGENCY MEDICINE

## 2024-02-29 PROCEDURE — 99285 EMERGENCY DEPT VISIT HI MDM: CPT

## 2024-02-29 PROCEDURE — 73502 X-RAY EXAM HIP UNI 2-3 VIEWS: CPT

## 2024-02-29 PROCEDURE — 73700 CT LOWER EXTREMITY W/O DYE: CPT

## 2024-02-29 RX ORDER — KETOROLAC TROMETHAMINE 30 MG/ML
30 INJECTION, SOLUTION INTRAMUSCULAR; INTRAVENOUS ONCE
Status: COMPLETED | OUTPATIENT
Start: 2024-02-29 | End: 2024-02-29

## 2024-02-29 RX ORDER — ONDANSETRON 2 MG/ML
4 INJECTION INTRAMUSCULAR; INTRAVENOUS ONCE
Status: COMPLETED | OUTPATIENT
Start: 2024-02-29 | End: 2024-02-29

## 2024-02-29 RX ADMIN — HYDROMORPHONE HYDROCHLORIDE 1 MG: 1 INJECTION, SOLUTION INTRAMUSCULAR; INTRAVENOUS; SUBCUTANEOUS at 13:26

## 2024-02-29 RX ADMIN — ONDANSETRON 4 MG: 2 INJECTION INTRAMUSCULAR; INTRAVENOUS at 13:25

## 2024-02-29 RX ADMIN — KETOROLAC TROMETHAMINE 30 MG: 30 INJECTION, SOLUTION INTRAMUSCULAR; INTRAVENOUS at 14:53

## 2024-02-29 NOTE — ED PROVIDER NOTES
Time: 1:19 PM EST  Date of encounter:  2/29/2024  Independent Historian/Clinical History and Information was obtained by:   Patient    History is limited by: N/A    Chief Complaint: Fall, right hip pain      History of Present Illness:  Patient is a 88 y.o. year old male who presents to the emergency department for evaluation of right hip injury after a fall.  Patient states he has peripheral neuropathy and lost his balance in a parking lot today and fell onto his right hip.  Is denying any other injury.    HPI    Patient Care Team  Primary Care Provider: Akila Florence APRN    Past Medical History:     Allergies   Allergen Reactions    Gabapentin Unknown - Low Severity    Lovastatin Unknown - Low Severity    Lyrica [Pregabalin] Unknown - Low Severity    Nsaids GI Intolerance    Paroxetine Unknown - Low Severity     Past Medical History:   Diagnosis Date    Pineda's esophagus     Basal cell carcinoma     Compression fracture of first lumbar vertebra 2022    L1, L2, L5    Depression     GERD (gastroesophageal reflux disease)     Glaucoma     Hiatal hernia     History of BPH     Hyperlipidemia     Idiopathic peripheral neuropathy 2009    Nonrheumatic aortic valve stenosis     Osteoarthritis     Osteoporosis     Periodontal disease     Pneumothorax     S/P kyphoplasty     L5     Past Surgical History:   Procedure Laterality Date    APPENDECTOMY  1953    CATARACT EXTRACTION  2015    CHOLECYSTECTOMY  2000    COLONOSCOPY  09/2014    ENDOSCOPY  01/2020    Pineda's Esophagitis    TONSILLECTOMY AND ADENOIDECTOMY       Family History   Problem Relation Age of Onset    Colon cancer Neg Hx        Home Medications:  Prior to Admission medications    Medication Sig Start Date End Date Taking? Authorizing Provider   acetaminophen (TYLENOL) 325 MG tablet Take 650 mg by mouth Every 6 (Six) Hours As Needed for Mild Pain.    Provider, MD Dariela   calcium carbonate EX (TUMS EX) 750 MG chewable tablet Chew 750 mg Daily.     Dariela Ram MD   cyanocobalamin (VITAMIN B-12) 1000 MCG tablet cyanocobalamin (vit B-12) 1,000 mcg tablet   TAKE ONE TABLET BY MOUTH EVERY DAY    Dariela Ram MD   DULoxetine (CYMBALTA) 30 MG capsule Take 1 capsule by mouth Daily. 2/3/23   Dariela Ram MD   DULoxetine (CYMBALTA) 60 MG capsule Take 1 capsule by mouth Daily.    Dariela Ram MD   fluticasone (FLONASE) 50 MCG/ACT nasal spray 2 sprays into the nostril(s) as directed by provider Daily.    Dariela Ram MD   HYDROcodone-acetaminophen (NORCO) 7.5-325 MG per tablet Every 6 (Six) Hours.    Dariela Ram MD   ketoconazole (NIZORAL) 2 % shampoo ketoconazole 2 % shampoo   USE AS DIRECTED THREE TIMES A WEEK    Dariela Ram MD   latanoprost (XALATAN) 0.005 % ophthalmic solution INSTILL ONE DROP INTO AFFECTED EYE EVERY EVENING 23   Dariela Ram MD   Milk of Magnesia 400 MG/5ML suspension TAKE 5ml BY MOUTH EVERY DAY AS NEEDED FOR no bowel movement FOR more THAN 3 DAYS 22   Dariela Ram MD   mirtazapine (REMERON) 15 MG tablet Take 1 tablet by mouth every night at bedtime. 23   Dariela Ram MD   Nutritional Supplements (JOINT FORMULA PO) Take  by mouth.    Dariela Ram MD   omeprazole (priLOSEC) 20 MG capsule TAKE ONE CAPSULE BY MOUTH 30 minutes BEFORE morning meal EVERY DAY 2/3/23   Dariela Ram MD   tamsulosin (FLOMAX) 0.4 MG capsule 24 hr capsule tamsulosin 0.4 mg capsule   TAKE ONE CAPSULE BY MOUTH EVERY DAY    Dariela Ram MD   vitamin D3 125 MCG (5000 UT) capsule capsule cholecalciferol (vitamin D3) 125 mcg (5,000 unit) capsule   TAKE ONE CAPSULE BY MOUTH EVERY DAY    Dariela Ram MD        Social History:   Social History     Tobacco Use    Smoking status: Former     Types: Cigarettes     Quit date: 1965     Years since quittin.2    Smokeless tobacco: Never   Vaping Use    Vaping Use: Never used   Substance Use Topics  "   Alcohol use: Not Currently    Drug use: Never         Review of Systems:  Review of Systems   Musculoskeletal:  Positive for arthralgias.   Neurological:  Positive for numbness.        Physical Exam:  BP (!) 178/102 (BP Location: Right arm, Patient Position: Lying)   Pulse 80   Temp 98.8 °F (37.1 °C) (Oral)   Resp 20   Ht 188 cm (74\")   Wt 81.9 kg (180 lb 8.9 oz)   SpO2 97%   BMI 23.18 kg/m²     Physical Exam  Vitals and nursing note reviewed.   Constitutional:       General: He is not in acute distress.  HENT:      Head: Normocephalic and atraumatic.   Cardiovascular:      Rate and Rhythm: Normal rate and regular rhythm.      Heart sounds: Normal heart sounds.   Pulmonary:      Effort: Pulmonary effort is normal. No respiratory distress.      Breath sounds: Normal breath sounds.   Abdominal:      Palpations: Abdomen is soft.      Tenderness: There is no abdominal tenderness.   Musculoskeletal:         General: Tenderness present.      Cervical back: Normal range of motion.      Comments: Right hip tenderness mild pain with any flexion or abduction   Skin:     General: Skin is warm and dry.      Capillary Refill: Capillary refill takes less than 2 seconds.   Neurological:      Mental Status: He is alert and oriented to person, place, and time.   Psychiatric:         Mood and Affect: Mood normal.                  Procedures:  Procedures      Medical Decision Making:      Comorbidities that affect care:    Osteoporosis    External Notes reviewed:    Previous ED Note: Patient seen in this ER on 12/24/2023 for GERD      The following orders were placed and all results were independently analyzed by me:  Orders Placed This Encounter   Procedures    XR Hip With or Without Pelvis 2 - 3 View Right       Medications Given in the Emergency Department:  Medications - No data to display     ED Course:         Labs:    Lab Results (last 24 hours)       ** No results found for the last 24 hours. **         "     Imaging:    No Radiology Exams Resulted Within Past 24 Hours      Differential Diagnosis and Discussion:    Trauma:  Differential diagnosis considered but not limited to were subarachnoid hemorrhage, intracranial bleeding, pneumothorax, cardiac contusion, lung contusion, intra-abdominal bleeding, and compartment syndrome of any extremity or other significant traumatic pathology    {Independent Review of (Optional):38408}    MDM     {Critical Care:70878}    {SEPSIS RECOGNITION:85076}    Patient Care Considerations:    {Considerations (Optional):32657}      Consultants/Shared Management Plan:    {Shared Management Plan (Optional):35600}    Social Determinants of Health:    {Social Determinants of Health (Optional):09819}      Disposition and Care Coordination:    {Admission consideration:85717}    {Discharge (Optional):86928}    Final diagnoses:   None        ED Disposition       None            This medical record created using voice recognition software.           Eosinophils, Absolute 0.05 10*3/mm3      Basophils, Absolute 0.05 10*3/mm3      Immature Grans, Absolute 0.12 10*3/mm3      nRBC 0.0 /100 WBC     BNP [[id number]]  (Normal) Collected: 03/08/24 0330    Specimen: Blood Updated: 03/08/24 1209     proBNP 568.8 pg/mL     Narrative:      This assay is used as an aid in the diagnosis of individuals suspected of having heart failure. It can be used as an aid in the diagnosis of acute decompensated heart failure (ADHF) in patients presenting with signs and symptoms of ADHF to the emergency department (ED). In addition, NT-proBNP of <300 pg/mL indicates ADHF is not likely.    Age Range Result Interpretation  NT-proBNP Concentration (pg/mL:      <50             Positive            >450                   Gray                 300-450                    Negative             <300    50-75           Positive            >900                  Gray                300-900                  Negative            <300      >75             Positive            >1800                  Gray                300-1800                  Negative            <300    Lactic Acid, Plasma [[id number]]  (Normal) Collected: 03/08/24 1326    Specimen: Blood from Arm, Right Updated: 03/08/24 1358     Lactate 1.3 mmol/L     Blood Culture - Blood, Arm, Right [[id number]] Collected: 03/08/24 1326    Specimen: Blood from Arm, Right Updated: 03/08/24 1336    CBC & Differential [[id number]]  (Abnormal) Collected: 03/08/24 1326    Specimen: Blood from Arm, Right Updated: 03/08/24 1339    Narrative:      The following orders were created for panel order CBC & Differential.  Procedure                               Abnormality         Status                     ---------                               -----------         ------                     CBC Auto Differential[[id number]]        Abnormal            Final result                 Please view results for these tests on the individual orders.    Comprehensive Metabolic  Panel [[id number]]  (Abnormal) Collected: 03/08/24 1326    Specimen: Blood from Arm, Right Updated: 03/08/24 1403     Glucose 117 mg/dL      BUN 27 mg/dL      Creatinine 0.80 mg/dL      Sodium 144 mmol/L      Potassium 3.5 mmol/L      Chloride 110 mmol/L      CO2 22.6 mmol/L      Calcium 8.4 mg/dL      Total Protein 6.7 g/dL      Albumin 3.1 g/dL      ALT (SGPT) 44 U/L      AST (SGOT) 54 U/L      Alkaline Phosphatase 99 U/L      Total Bilirubin 2.0 mg/dL      Globulin 3.6 gm/dL      A/G Ratio 0.9 g/dL      BUN/Creatinine Ratio 33.8     Anion Gap 11.4 mmol/L      eGFR 85.1 mL/min/1.73     Narrative:      GFR Normal >60  Chronic Kidney Disease <60  Kidney Failure <15    The GFR formula is only valid for adults with stable renal function between ages 18 and 70.    CBC Auto Differential [[id number]]  (Abnormal) Collected: 03/08/24 1326    Specimen: Blood from Arm, Right Updated: 03/08/24 1339     WBC 13.71 10*3/mm3      RBC 3.24 10*6/mm3      Hemoglobin 10.5 g/dL      Hematocrit 32.1 %      MCV 99.1 fL      MCH 32.4 pg      MCHC 32.7 g/dL      RDW 14.0 %      RDW-SD 50.6 fl      MPV 9.7 fL      Platelets 346 10*3/mm3      Neutrophil % 80.9 %      Lymphocyte % 10.1 %      Monocyte % 7.6 %      Eosinophil % 0.1 %      Basophil % 0.3 %      Immature Grans % 1.0 %      Neutrophils, Absolute 11.09 10*3/mm3      Lymphocytes, Absolute 1.38 10*3/mm3      Monocytes, Absolute 1.04 10*3/mm3      Eosinophils, Absolute 0.02 10*3/mm3      Basophils, Absolute 0.04 10*3/mm3      Immature Grans, Absolute 0.14 10*3/mm3      nRBC 0.0 /100 WBC     Procalcitonin [[id number]]  (Normal) Collected: 03/08/24 1326    Specimen: Blood from Arm, Right Updated: 03/08/24 1407     Procalcitonin 0.20 ng/mL     Narrative:      As a Marker for Sepsis (Non-Neonates):    1. <0.5 ng/mL represents a low risk of severe sepsis and/or septic shock.  2. >2 ng/mL represents a high risk of severe sepsis and/or septic shock.    As a Marker for Lower Respiratory Tract  "Infections that require antibiotic therapy:    PCT on Admission    Antibiotic Therapy       6-12 Hrs later    >0.5                Strongly Recommended  >0.25 - <0.5        Recommended  0.1 - 0.25          Discouraged              Remeasure/reassess PCT  <0.1                Strongly Discouraged     Remeasure/reassess PCT    As 28 day mortality risk marker: \"Change in Procalcitonin Result\" (>80% or <=80%) if Day 0 (or Day 1) and Day 4 values are available. Refer to http://[email or web address]-pct-calculator.com    Change in PCT <=80%  A decrease of PCT levels below or equal to 80% defines a positive change in PCT test result representing a higher risk for 28-day all-cause mortality of patients diagnosed with severe sepsis for septic shock.    Change in PCT >80%  A decrease of PCT levels of more than 80% defines a negative change in PCT result representing a lower risk for 28-day all-cause mortality of patients diagnosed with severe sepsis or septic shock.    This test is Prognostic not Diagnostic, if elevated correlate with clinical findings before administering antibiotic treatment.        Blood Culture - Blood, Arm, Right [[id number]] Collected: 03/08/24 1330    Specimen: Blood from Arm, Right Updated: 03/08/24 1336    Urinalysis With Culture If Indicated - Urine, Clean Catch [[id number]]  (Abnormal) Collected: 03/08/24 1339    Specimen: Urine, Clean Catch Updated: 03/08/24 1403     Color, UA Dark Yellow     Appearance, UA Clear     pH, UA 6.0     Specific Gravity, UA 1.028     Glucose, UA Negative     Ketones, UA Trace     Bilirubin, UA Small (1+)     Blood, UA Negative     Protein, UA 30 mg/dL (1+)     Leuk Esterase, UA Trace     Nitrite, UA Negative     Urobilinogen, UA 2.0 E.U./dL    Narrative:      In absence of clinical symptoms, the presence of pyuria, bacteria, and/or nitrites on the urinalysis result does not correlate with infection.    Urinalysis, Microscopic Only - Urine, Clean Catch [[id number]]  (Abnormal) " Collected: 03/08/24 1339    Specimen: Urine, Clean Catch Updated: 03/08/24 1403     RBC, UA 3-5 /HPF      WBC, UA 0-2 /HPF      Comment: Urine culture not indicated.        Bacteria, UA None Seen /HPF      Squamous Epithelial Cells, UA 0-2 /HPF      Hyaline Casts, UA None Seen /LPF      Methodology Automated Microscopy    MRSA Screen, PCR (Inpatient) - Swab, Nares [[id number]]  (Abnormal) Collected: 03/08/24 1708    Specimen: Swab from Nares Updated: 03/08/24 1954     MRSA PCR MRSA Detected    Narrative:      The negative predictive value of this diagnostic test is high and should only be used to consider de-escalating anti-MRSA therapy. A positive result may indicate colonization with MRSA and must be correlated clinically.             Imaging:    XR Hip With or Without Pelvis 2 - 3 View Left    Result Date: 3/8/2024  PROCEDURE: XR HIP W OR WO PELVIS 2-3 VIEW LEFT  COMPARISON: [facility], CT, CT LOWER EXTREMITY RIGHT WO CONTRAST, 2/29/2024, 15:10.  [facility], CR, XR HIP W OR WO PELVIS 2-3 VIEW RIGHT, 2/29/2024, 13:33.  INDICATIONS: GENERALIZED LEFT HIP PAIN AFTER FALL TODAY  FINDINGS:  Again seen is smooth deformity of the right acetabulum consistent with healing fractures.  No evidence of new or acute pelvic fracture.  Sacroiliac joints appear intact.  There is mild narrowing of the hip joint spaces bilaterally.  No evidence of left hip fracture.  Patient is again noted be status post kyphoplasty at the L5 level.        1. No acute pelvic or left hip fracture 2. Healing fracture of the right acetabulum      ANGLE OLIVIA MD       Electronically Signed and Approved By: ANGLE OLIVIA MD on 3/08/2024 at 10:48             XR Chest 1 View    Result Date: 3/8/2024  PROCEDURE: XR CHEST 1 VW  COMPARISON: [facility], CR, XR CHEST 1 VW, 12/24/2023, 9:50.  INDICATIONS: concern for aspiration  FINDINGS:  Heart size is at the upper limits of normal.  There is patchy airspace disease  throughout the right lung consistent with multifocal pneumonia.  Left lung is clear.  No pleural effusion.  No pneumothorax.  Bony structures are unremarkable.        1. Patchy airspace disease in the right lung consistent with multifocal pneumonia.  Follow-up to complete radiographic resolution would be recommended.       ANGLE OLIVIA MD       Electronically Signed and Approved By: ANGLE OLIVIA MD on 3/08/2024 at 10:46                Differential Diagnosis and Discussion:    Trauma:  Differential diagnosis considered but not limited to were subarachnoid hemorrhage, intracranial bleeding, pneumothorax, cardiac contusion, lung contusion, intra-abdominal bleeding, and compartment syndrome of any extremity or other significant traumatic pathology    All X-rays impressions were independently interpreted by me.    MDM  Patient has a complex acetabular fracture and will require trauma orthopedic surgery for repair.  Patient will be transferred to [facility].        Patient Care Considerations:          Consultants/Shared Management Plan:  Patient discussed with Dr. Westfall, orthopedic surgery, revommends transfer to trauma center    Patient discussed with Dr. Sanders, orthopedic surgery at [facility], he will evaluate the patient in the emergency department.    Patient discussed with Dr. Ricks at [facility] emergency department and he will accept the patient in transfer.  Social Determinants of Health:    Patient is a nursing home/assisted living resident and has reliable access to care.      Disposition and Care Coordination:    Transferred: Through independent evaluation of the patient's history, physical, and imperical data, the patient meets criteria to be transferred to another hospital for evaluation/admission.        Final diagnoses:   Closed displaced fracture of right acetabulum, unspecified portion of acetabulum, initial encounter        ED Disposition       ED Disposition   Transfer to Another Facility     Condition    --    Comment   --               This medical record created using voice recognition software.             Az Braswell DO  03/08/24 1599

## 2024-03-08 PROBLEM — J69.0 ASPIRATION PNEUMONIA: Status: ACTIVE | Noted: 2024-01-01

## 2024-03-08 NOTE — ED PROVIDER NOTES
Time: 10:02 AM EST  Date of encounter:  3/8/2024  Independent Historian/Clinical History and Information was obtained by:   Patient and Family    History is limited by: N/A    Chief Complaint: Fall out of bed      History of Present Illness:  Patient is a 88 y.o. year old male who presents to the emergency department for evaluation of injury status post fall out of bed.  Patient was at [facility] rehab after being discharged with an acetabular fracture that is being left to heal without surgical intervention.  According to family at bedside this was patient's choice.  Patient's family does also report he has been choking with swallowing/drinking.    HPI    Patient Care Team  Primary Care Provider: Akila Florence APRN    Past Medical History:     Allergies   Allergen Reactions    Gabapentin Unknown - Low Severity    Lovastatin Unknown - Low Severity    Lyrica [Pregabalin] Unknown - Low Severity    Nsaids GI Intolerance    Paroxetine Unknown - Low Severity     Past Medical History:   Diagnosis Date    Pineda's esophagus     Basal cell carcinoma     Compression fracture of first lumbar vertebra 2022    L1, L2, L5    Depression     GERD (gastroesophageal reflux disease)     Glaucoma     Hiatal hernia     History of BPH     Hyperlipidemia     Idiopathic peripheral neuropathy 2009    Nonrheumatic aortic valve stenosis     Osteoarthritis     Osteoporosis     Periodontal disease     Pneumothorax     S/P kyphoplasty     L5     Past Surgical History:   Procedure Laterality Date    APPENDECTOMY  1953    CATARACT EXTRACTION  2015    CHOLECYSTECTOMY  2000    COLONOSCOPY  09/2014    ENDOSCOPY  01/2020    Pineda's Esophagitis    TONSILLECTOMY AND ADENOIDECTOMY       Family History   Problem Relation Age of Onset    Colon cancer Neg Hx        Home Medications:  Prior to Admission medications    Medication Sig Start Date End Date Taking? Authorizing Provider   acetaminophen (TYLENOL) 325 MG tablet Take 650 mg by mouth Every 6 (Six)  Hours As Needed for Mild Pain.    Dariela Ram MD   calcium carbonate EX (TUMS EX) 750 MG chewable tablet Chew 750 mg Daily.    Dariela Ram MD   cyanocobalamin (VITAMIN B-12) 1000 MCG tablet cyanocobalamin (vit B-12) 1,000 mcg tablet   TAKE ONE TABLET BY MOUTH EVERY DAY    Dariela Ram MD   DULoxetine (CYMBALTA) 30 MG capsule Take 1 capsule by mouth Daily. 2/3/23   Dariela Ram MD   DULoxetine (CYMBALTA) 60 MG capsule Take 1 capsule by mouth Daily.    Dariela Ram MD   fluticasone (FLONASE) 50 MCG/ACT nasal spray 2 sprays into the nostril(s) as directed by provider Daily.    Dariela Ram MD   HYDROcodone-acetaminophen (NORCO) 7.5-325 MG per tablet Every 6 (Six) Hours.    Dariela Ram MD   ketoconazole (NIZORAL) 2 % shampoo ketoconazole 2 % shampoo   USE AS DIRECTED THREE TIMES A WEEK    Dariela aRm MD   latanoprost (XALATAN) 0.005 % ophthalmic solution INSTILL ONE DROP INTO AFFECTED EYE EVERY EVENING 2/11/23   Dariela Ram MD   Milk of Magnesia 400 MG/5ML suspension TAKE 5ml BY MOUTH EVERY DAY AS NEEDED FOR no bowel movement FOR more THAN 3 DAYS 12/6/22   Dariela Ram MD   mirtazapine (REMERON) 15 MG tablet Take 1 tablet by mouth every night at bedtime. 2/13/23   Dariela Ram MD   Nutritional Supplements (JOINT FORMULA PO) Take  by mouth.    Dariela Ram MD   omeprazole (priLOSEC) 20 MG capsule TAKE ONE CAPSULE BY MOUTH 30 minutes BEFORE morning meal EVERY DAY 2/3/23   Dariela Ram MD   tamsulosin (FLOMAX) 0.4 MG capsule 24 hr capsule tamsulosin 0.4 mg capsule   TAKE ONE CAPSULE BY MOUTH EVERY DAY    Dariela Ram MD   vitamin D3 125 MCG (5000 UT) capsule capsule cholecalciferol (vitamin D3) 125 mcg (5,000 unit) capsule   TAKE ONE CAPSULE BY MOUTH EVERY DAY    Dariela Ram MD        Social History:   Social History     Tobacco Use    Smoking status: Former     Current packs/day:  "0.00     Types: Cigarettes     Quit date: 1965     Years since quittin.2    Smokeless tobacco: Never   Vaping Use    Vaping status: Never Used   Substance Use Topics    Alcohol use: Not Currently    Drug use: Never         Review of Systems:  Review of Systems   Constitutional:  Negative for chills and fever.   HENT:  Negative for congestion, rhinorrhea and sore throat.    Eyes:  Negative for pain and visual disturbance.   Respiratory:  Negative for apnea, cough, chest tightness and shortness of breath.    Cardiovascular:  Negative for chest pain and palpitations.   Gastrointestinal:  Negative for abdominal pain, diarrhea, nausea and vomiting.   Genitourinary:  Negative for difficulty urinating and dysuria.   Musculoskeletal:  Negative for joint swelling and myalgias.   Skin:  Negative for color change.   Neurological:  Negative for seizures and headaches.   Psychiatric/Behavioral: Negative.     All other systems reviewed and are negative.       Physical Exam:  /87   Pulse 78   Temp 97.6 °F (36.4 °C) (Oral)   Resp 20   Ht 188 cm (74\")   Wt 77 kg (169 lb 12.1 oz)   SpO2 94%   BMI 21.80 kg/m²     Physical Exam  Vitals and nursing note reviewed.   Constitutional:       General: He is not in acute distress.     Appearance: Normal appearance. He is not toxic-appearing.   HENT:      Head: Normocephalic and atraumatic.      Jaw: There is normal jaw occlusion.      Mouth/Throat:      Mouth: Mucous membranes are dry.   Eyes:      General: Lids are normal.      Extraocular Movements: Extraocular movements intact.      Conjunctiva/sclera: Conjunctivae normal.      Pupils: Pupils are equal, round, and reactive to light.   Cardiovascular:      Rate and Rhythm: Normal rate and regular rhythm.      Pulses: Normal pulses.      Heart sounds: Normal heart sounds.   Pulmonary:      Effort: Pulmonary effort is normal. No respiratory distress.      Breath sounds: Normal breath sounds. No wheezing or rhonchi. "   Abdominal:      General: Abdomen is flat.      Palpations: Abdomen is soft.      Tenderness: There is no abdominal tenderness. There is no guarding or rebound.   Musculoskeletal:      Cervical back: Normal range of motion and neck supple.      Right lower leg: No edema.      Left lower leg: No edema.      Comments: Bilateral hip tenderness, no obvious deformities   Skin:     General: Skin is warm and dry.   Neurological:      General: No focal deficit present.      Mental Status: He is alert.   Psychiatric:         Mood and Affect: Mood normal.                  Procedures:  Procedures      Medical Decision Making:      Comorbidities that affect care:    Recent right-sided acetabular fracture    External Notes reviewed:    Previous Radiological Studies: Indicating acetabular fracture and Previous Labs: 3 days ago with labs including CBC and CMP      The following orders were placed and all results were independently analyzed by me:  Orders Placed This Encounter   Procedures    Blood Culture - Blood,    Blood Culture - Blood,    Respiratory Panel PCR w/COVID-19(SARS-CoV-2) NELLY/TYRON/DESIRE/PAD/COR/NEYDA In-House, NP Swab in UTM/VTM, 2 HR TAT - Swab, Nasopharynx    MRSA Screen, PCR (Inpatient) - Swab, Nares    XR Hip With or Without Pelvis 2 - 3 View Left    XR Chest 1 View    Lactic Acid, Plasma    Comprehensive Metabolic Panel    CBC Auto Differential    Urinalysis With Culture If Indicated - Urine, Clean Catch    Procalcitonin    Basic Metabolic Panel    Urinalysis, Microscopic Only - Urine, Clean Catch    Code Status and Medical Interventions:    Inpatient Hospitalist Consult    Inpatient Admission    CBC & Differential    CBC & Differential       Medications Given in the Emergency Department:  Medications   vancomycin IVPB 1500 mg in 0.9% NaCl (Premix) 500 mL (1,500 mg Intravenous New Bag 3/8/24 1343)   Pharmacy to dose vancomycin (has no administration in time range)   ampicillin-sulbactam 3 g/100 mL 0.9% NS IVPB (has  no administration in time range)   lactated ringers infusion (has no administration in time range)   QUEtiapine (SEROquel) tablet 25 mg (has no administration in time range)   sodium chloride 0.9 % bolus 1,000 mL (0 mL Intravenous Stopped 3/8/24 1230)        ED Course:         Labs:    Lab Results (last 24 hours)       Procedure Component Value Units Date/Time    Basic Metabolic Panel [[id number]]  (Abnormal) Collected: 03/08/24 0330    Specimen: Blood Updated: 03/08/24 0426     Glucose 115 mg/dL      BUN 31 mg/dL      Creatinine 0.80 mg/dL      Sodium 144 mmol/L      Potassium 3.3 mmol/L      Chloride 107 mmol/L      CO2 23.0 mmol/L      Calcium 8.7 mg/dL      BUN/Creatinine Ratio 38.8     Anion Gap 14.0 mmol/L      eGFR 85.1 mL/min/1.73     Narrative:      GFR Normal >60  Chronic Kidney Disease <60  Kidney Failure <15    The GFR formula is only valid for adults with stable renal function between ages 18 and 70.    CBC & Differential [[id number]]  (Abnormal) Collected: 03/08/24 0330    Specimen: Blood Updated: 03/08/24 0408    Narrative:      The following orders were created for panel order CBC & Differential.  Procedure                               Abnormality         Status                     ---------                               -----------         ------                     CBC Auto Differential[[id number]]        Abnormal            Final result                 Please view results for these tests on the individual orders.    CBC Auto Differential [[id number]]  (Abnormal) Collected: 03/08/24 0330    Specimen: Blood Updated: 03/08/24 0408     WBC 14.90 10*3/mm3      RBC 3.35 10*6/mm3      Hemoglobin 10.8 g/dL      Hematocrit 33.0 %      MCV 98.5 fL      MCH 32.2 pg      MCHC 32.7 g/dL      RDW 13.9 %      RDW-SD 49.5 fl      MPV 10.1 fL      Platelets 340 10*3/mm3      Neutrophil % 80.6 %      Lymphocyte % 9.9 %      Monocyte % 8.1 %      Eosinophil % 0.3 %      Basophil % 0.3 %      Immature Grans % 0.8 %       Neutrophils, Absolute 12.00 10*3/mm3      Lymphocytes, Absolute 1.48 10*3/mm3      Monocytes, Absolute 1.20 10*3/mm3      Eosinophils, Absolute 0.05 10*3/mm3      Basophils, Absolute 0.05 10*3/mm3      Immature Grans, Absolute 0.12 10*3/mm3      nRBC 0.0 /100 WBC     BNP [[id number]]  (Normal) Collected: 03/08/24 0330    Specimen: Blood Updated: 03/08/24 1209     proBNP 568.8 pg/mL     Narrative:      This assay is used as an aid in the diagnosis of individuals suspected of having heart failure. It can be used as an aid in the diagnosis of acute decompensated heart failure (ADHF) in patients presenting with signs and symptoms of ADHF to the emergency department (ED). In addition, NT-proBNP of <300 pg/mL indicates ADHF is not likely.    Age Range Result Interpretation  NT-proBNP Concentration (pg/mL:      <50             Positive            >450                   Gray                 300-450                    Negative             <300    50-75           Positive            >900                  Gray                300-900                  Negative            <300      >75             Positive            >1800                  Gray                300-1800                  Negative            <300    Lactic Acid, Plasma [[id number]]  (Normal) Collected: 03/08/24 1326    Specimen: Blood from Arm, Right Updated: 03/08/24 1358     Lactate 1.3 mmol/L     Blood Culture - Blood, Arm, Right [[id number]] Collected: 03/08/24 1326    Specimen: Blood from Arm, Right Updated: 03/08/24 1336    CBC & Differential [[id number]]  (Abnormal) Collected: 03/08/24 1326    Specimen: Blood from Arm, Right Updated: 03/08/24 1339    Narrative:      The following orders were created for panel order CBC & Differential.  Procedure                               Abnormality         Status                     ---------                               -----------         ------                     CBC Auto Differential[[id number]]        Abnormal             Final result                 Please view results for these tests on the individual orders.    Comprehensive Metabolic Panel [[id number]]  (Abnormal) Collected: 03/08/24 1326    Specimen: Blood from Arm, Right Updated: 03/08/24 1403     Glucose 117 mg/dL      BUN 27 mg/dL      Creatinine 0.80 mg/dL      Sodium 144 mmol/L      Potassium 3.5 mmol/L      Chloride 110 mmol/L      CO2 22.6 mmol/L      Calcium 8.4 mg/dL      Total Protein 6.7 g/dL      Albumin 3.1 g/dL      ALT (SGPT) 44 U/L      AST (SGOT) 54 U/L      Alkaline Phosphatase 99 U/L      Total Bilirubin 2.0 mg/dL      Globulin 3.6 gm/dL      A/G Ratio 0.9 g/dL      BUN/Creatinine Ratio 33.8     Anion Gap 11.4 mmol/L      eGFR 85.1 mL/min/1.73     Narrative:      GFR Normal >60  Chronic Kidney Disease <60  Kidney Failure <15    The GFR formula is only valid for adults with stable renal function between ages 18 and 70.    CBC Auto Differential [[id number]]  (Abnormal) Collected: 03/08/24 1326    Specimen: Blood from Arm, Right Updated: 03/08/24 1339     WBC 13.71 10*3/mm3      RBC 3.24 10*6/mm3      Hemoglobin 10.5 g/dL      Hematocrit 32.1 %      MCV 99.1 fL      MCH 32.4 pg      MCHC 32.7 g/dL      RDW 14.0 %      RDW-SD 50.6 fl      MPV 9.7 fL      Platelets 346 10*3/mm3      Neutrophil % 80.9 %      Lymphocyte % 10.1 %      Monocyte % 7.6 %      Eosinophil % 0.1 %      Basophil % 0.3 %      Immature Grans % 1.0 %      Neutrophils, Absolute 11.09 10*3/mm3      Lymphocytes, Absolute 1.38 10*3/mm3      Monocytes, Absolute 1.04 10*3/mm3      Eosinophils, Absolute 0.02 10*3/mm3      Basophils, Absolute 0.04 10*3/mm3      Immature Grans, Absolute 0.14 10*3/mm3      nRBC 0.0 /100 WBC     Procalcitonin [[id number]]  (Normal) Collected: 03/08/24 1326    Specimen: Blood from Arm, Right Updated: 03/08/24 1407     Procalcitonin 0.20 ng/mL     Narrative:      As a Marker for Sepsis (Non-Neonates):    1. <0.5 ng/mL represents a low risk of severe sepsis and/or septic  "shock.  2. >2 ng/mL represents a high risk of severe sepsis and/or septic shock.    As a Marker for Lower Respiratory Tract Infections that require antibiotic therapy:    PCT on Admission    Antibiotic Therapy       6-12 Hrs later    >0.5                Strongly Recommended  >0.25 - <0.5        Recommended  0.1 - 0.25          Discouraged              Remeasure/reassess PCT  <0.1                Strongly Discouraged     Remeasure/reassess PCT    As 28 day mortality risk marker: \"Change in Procalcitonin Result\" (>80% or <=80%) if Day 0 (or Day 1) and Day 4 values are available. Refer to http://[email or web address]-pct-calculator.com    Change in PCT <=80%  A decrease of PCT levels below or equal to 80% defines a positive change in PCT test result representing a higher risk for 28-day all-cause mortality of patients diagnosed with severe sepsis for septic shock.    Change in PCT >80%  A decrease of PCT levels of more than 80% defines a negative change in PCT result representing a lower risk for 28-day all-cause mortality of patients diagnosed with severe sepsis or septic shock.    This test is Prognostic not Diagnostic, if elevated correlate with clinical findings before administering antibiotic treatment.        Blood Culture - Blood, Arm, Right [[id number]] Collected: 03/08/24 1330    Specimen: Blood from Arm, Right Updated: 03/08/24 1336    Urinalysis With Culture If Indicated - Urine, Clean Catch [[id number]]  (Abnormal) Collected: 03/08/24 1339    Specimen: Urine, Clean Catch Updated: 03/08/24 1403     Color, UA Dark Yellow     Appearance, UA Clear     pH, UA 6.0     Specific Gravity, UA 1.028     Glucose, UA Negative     Ketones, UA Trace     Bilirubin, UA Small (1+)     Blood, UA Negative     Protein, UA 30 mg/dL (1+)     Leuk Esterase, UA Trace     Nitrite, UA Negative     Urobilinogen, UA 2.0 E.U./dL    Narrative:      In absence of clinical symptoms, the presence of pyuria, bacteria, and/or nitrites on the urinalysis " result does not correlate with infection.    Urinalysis, Microscopic Only - Urine, Clean Catch [[id number]]  (Abnormal) Collected: 03/08/24 1339    Specimen: Urine, Clean Catch Updated: 03/08/24 1403     RBC, UA 3-5 /HPF      WBC, UA 0-2 /HPF      Comment: Urine culture not indicated.        Bacteria, UA None Seen /HPF      Squamous Epithelial Cells, UA 0-2 /HPF      Hyaline Casts, UA None Seen /LPF      Methodology Automated Microscopy             Imaging:    XR Hip With or Without Pelvis 2 - 3 View Left    Result Date: 3/8/2024  PROCEDURE: XR HIP W OR WO PELVIS 2-3 VIEW LEFT  COMPARISON: [facility], CT, CT LOWER EXTREMITY RIGHT WO CONTRAST, 2/29/2024, 15:10.  [facility], CR, XR HIP W OR WO PELVIS 2-3 VIEW RIGHT, 2/29/2024, 13:33.  INDICATIONS: GENERALIZED LEFT HIP PAIN AFTER FALL TODAY  FINDINGS:  Again seen is smooth deformity of the right acetabulum consistent with healing fractures.  No evidence of new or acute pelvic fracture.  Sacroiliac joints appear intact.  There is mild narrowing of the hip joint spaces bilaterally.  No evidence of left hip fracture.  Patient is again noted be status post kyphoplasty at the L5 level.        1. No acute pelvic or left hip fracture 2. Healing fracture of the right acetabulum      ANGLE OLIVIA MD       Electronically Signed and Approved By: ANGLE OLIVIA MD on 3/08/2024 at 10:48             XR Chest 1 View    Result Date: 3/8/2024  PROCEDURE: XR CHEST 1 VW  COMPARISON: [facility], CR, XR CHEST 1 VW, 12/24/2023, 9:50.  INDICATIONS: concern for aspiration  FINDINGS:  Heart size is at the upper limits of normal.  There is patchy airspace disease throughout the right lung consistent with multifocal pneumonia.  Left lung is clear.  No pleural effusion.  No pneumothorax.  Bony structures are unremarkable.        1. Patchy airspace disease in the right lung consistent with multifocal pneumonia.  Follow-up to complete radiographic  resolution would be recommended.       ANGLE OLIVIA MD       Electronically Signed and Approved By: ANGLE OLIVIA MD on 3/08/2024 at 10:46                Differential Diagnosis and Discussion:    Metabolic: Differential diagnosis includes but is not limited to hypertension, hyperglycemia, hyperkalemia, hypocalcemia, metabolic acidosis, hypokalemia, hypoglycemia, malnutrition, hypothyroidism, hyperthyroidism, and adrenal insufficiency.   Trauma:  Differential diagnosis considered but not limited to were subarachnoid hemorrhage, intracranial bleeding, pneumothorax, cardiac contusion, lung contusion, intra-abdominal bleeding, and compartment syndrome of any extremity or other significant traumatic pathology    All labs were reviewed and interpreted by me.  All X-rays impressions were independently interpreted by me.    MDM  Number of Diagnoses or Management Options  Aspiration pneumonia of right lung, unspecified aspiration pneumonia type, unspecified part of lung  Dehydration  Diagnosis management comments: He was in summary this is a 89-year-old male patient who presents to the emerged department for evaluation.  Did follow that however x-ray of the left hip is unremarkable.  Chest x-ray however does confirm right-sided pneumonia and there is concern for patient aspirating.  CBC independently reviewed by me and shows no critical abnormalities except leukocytosis.  CMP independently reviewed by me and shows no critical abnormalities.  Patient given broad-spectrum antibiotics.  Patient case has been discussed with the hospitalist team who will admit to the hospital for further evaluation and continuation of treatment.                 Patient Care Considerations:    SEPSIS was considered but is NOT present in the emergency department as SIRS criteria is not present.      Consultants/Shared Management Plan:    Hospitalist: I have discussed the case with Dr. Winn who agrees to accept the patient for  admission.    Social Determinants of Health:    Patient is a nursing home/assisted living resident and has reliable access to care.      Disposition and Care Coordination:    Admit:   Through independent evaluation of the patient's history, physical, and imperical data, the patient meets criteria for inpatient admission to the hospital.        Final diagnoses:   Dehydration   Aspiration pneumonia of right lung, unspecified aspiration pneumonia type, unspecified part of lung        ED Disposition       ED Disposition   Decision to Admit    Condition   --    Comment   Level of Care: Med/Surg [1]   Diagnosis: Aspiration pneumonia [[id number]]   Admitting Physician: MARIBETH JEREZ [[id number]]   Attending Physician: MARIBETH JEREZ [[id number]]   Certification: I Certify That Inpatient Hospital Services Are Medically Necessary For Greater Than 2 Midnights                 This medical record created using voice recognition software.             Pramod Riojas MD  03/08/24 9921

## 2024-03-08 NOTE — PLAN OF CARE
Goal Outcome Evaluation:  Plan of Care Reviewed With: spouse     Patient very agitated and restless, pulled his Iv and 02 off multiple times. MD contacted and new orders received.      Progress: no change

## 2024-03-08 NOTE — H&P
Hospitalist H&P Note    Patient Identification:  Name: Ish Chavarria  Age: 88 y.o.  MRN: [id number]                          Chief Complaint:  cough    History of Present Illness:  89 y/o male with a h/o aortic stenosis, recent R acetabular fracture that was managed nonoperatively and patient was sent to [facility] rehab on 3/4 and was sent back in today as he was coughing more with eating. Per daughter patient was left without bed alarm and slid off the bed. Staff also noticed that he was coughing more with eating and was more confused and lethargic. He had some delirium since being admitted up in [place] that has not gotten much better. No fevers. No O2 requirements at home    Review of Systems:  Constitutional:  No fever or chills, no weight loss  Eyes:  No blurry or double vision  HENT:  No nasal congestion or sore throat   Respiratory:  + cough and shortness of breath   Cardiovascular:  No chest pain, palpitations, or edema   GI:  No abdominal pain, nausea, vomiting, bloody stools or diarrhea   :  No dysuria or hematuria  Musculoskeletal:  No back pain or joint pain   Skin:  No rash, no lumps  Neurologic:  No headache, focal weakness or dizziness  Endocrine:  No polyuria or polydipsia   Lymphatic:  No swollen glands   Psychiatric:  No depression or anxiety     Past Medical History:   Diagnosis Date    Pineda's esophagus     Basal cell carcinoma     Compression fracture of first lumbar vertebra 2022    L1, L2, L5    Depression     GERD (gastroesophageal reflux disease)     Glaucoma     Hiatal hernia     History of BPH     Hyperlipidemia     Idiopathic peripheral neuropathy 2009    Nonrheumatic aortic valve stenosis     Osteoarthritis     Osteoporosis     Periodontal disease     Pneumothorax     S/P kyphoplasty     L5       Past Surgical History:   Procedure Laterality Date    APPENDECTOMY  1953    CATARACT EXTRACTION  2015    CHOLECYSTECTOMY  2000    COLONOSCOPY  09/2014    ENDOSCOPY  01/2020    Pineda's  Esophagitis    TONSILLECTOMY AND ADENOIDECTOMY          Family History   Problem Relation Age of Onset    Colon cancer Neg Hx         Social History     Tobacco Use    Smoking status: Former     Current packs/day: 0.00     Types: Cigarettes     Quit date:      Years since quittin.2    Smokeless tobacco: Never   Substance Use Topics    Alcohol use: Not Currently        Allergies   Allergen Reactions    Gabapentin Unknown - Low Severity    Lovastatin Unknown - Low Severity    Lyrica [Pregabalin] Unknown - Low Severity    Nsaids GI Intolerance    Paroxetine Unknown - Low Severity       (Not in a hospital admission)      Objective:  Vitals:   Temp:  [97.6 °F (36.4 °C)] 97.6 °F (36.4 °C)  Heart Rate:  [64-78] 78  Resp:  [20] 20  BP: (127-139)/(83-89) 127/87    Physical Exam:  Gen: NAD, conversant.   Eyes: conjunctivae clear, moist mucous membranes  HENT: Atraumatic, oropharynx clear with moist mucous membranes  Neck: No lymphadenopathy, no thyromegaly  Resp: normal respiratory effort, crackles on R  CV: RRR, no MRGs, no peripheral edema  GI: Soft, nontender, nondistended, (+) BS.  Psych: Alert and Oriented x 2, Mood and affect appropriate to situation  Skin: warm and dry on palpation. No rash or ulcers on inspection.  M/S: bilateral UE and bilateral LE muscle mass and tone WNL for age  Neuro: normal motor function in all 4 ext, CN 2-12 intact    Data Review:  I have personally reviewed and interpreted all labs and imaging as well as all outside records    Labs:  Lab Results (last 24 hours)       ** No results found for the last 24 hours. **            Imaging:  Imaging Results (Last 24 Hours)       Procedure Component Value Units Date/Time    XR Hip With or Without Pelvis 2 - 3 View Left [[id number]] Collected: 24 1048     Updated: 24 1051    Narrative:      PROCEDURE: XR HIP W OR WO PELVIS 2-3 VIEW LEFT     COMPARISON: [facility], CT, CT LOWER EXTREMITY RIGHT WO CONTRAST, 2024,  15:10.    [facility], CR, XR HIP W OR WO PELVIS 2-3 VIEW RIGHT, 2/29/2024, 13:33.     INDICATIONS: GENERALIZED LEFT HIP PAIN AFTER FALL TODAY     FINDINGS:   Again seen is smooth deformity of the right acetabulum consistent with healing fractures.  No   evidence of new or acute pelvic fracture.  Sacroiliac joints appear intact.  There is mild   narrowing of the hip joint spaces bilaterally.  No evidence of left hip fracture.     Patient is again noted be status post kyphoplasty at the L5 level.       Impression:         1. No acute pelvic or left hip fracture  2. Healing fracture of the right acetabulum               ANGLE OLIVIA MD         Electronically Signed and Approved By: ANGLE OLIVIA MD on 3/08/2024 at 10:48                     XR Chest 1 View [[id number]] Collected: 03/08/24 1047     Updated: 03/08/24 1050    Narrative:      PROCEDURE: XR CHEST 1 VW     COMPARISON: [facility], CR, XR CHEST 1 VW, 12/24/2023, 9:50.     INDICATIONS: concern for aspiration     FINDINGS:   Heart size is at the upper limits of normal.  There is patchy airspace disease throughout the right   lung consistent with multifocal pneumonia.  Left lung is clear.  No pleural effusion.  No   pneumothorax.  Bony structures are unremarkable.       Impression:         1. Patchy airspace disease in the right lung consistent with multifocal pneumonia.  Follow-up to   complete radiographic resolution would be recommended.                  ANGLE OLIVIA MD         Electronically Signed and Approved By: ANGLE OLIVIA MD on 3/08/2024 at 10:46                             Assessment:    Aspiration pneumonia      Plan:  Aspiration pneumonia  [facility]  Sepsis  - CXR with R sided infiltrates  - check BCx, procal, RPP, mrsa screen  - SLP eval -- NPO until they see  - start Vanc and unasyn -- if mrsa screen neg could likely d/c vanc  - wean O2 as able    Encephalopathy  - likely delirium from multiple hospitalizations  - will  start some nightly seroquel    Recent R acetabular fracture  - non-op management  - PT greg Contreras MD  Hospitalist Group  3/8/2024  13:09 EST

## 2024-03-08 NOTE — PROGRESS NOTES
"[facility] Clinical Pharmacy Services: Vancomycin Pharmacokinetic Initial Consult Note    Ish Chavarria is a 88 y.o. male who is on day  of pharmacy to dose vancomycin for Pneumonia.    Consult Information  Consulting Provider: ELISHA Contreras  Planned Duration of Therapy: 7 days  Loading Dose Given: 1500 mg x 1  PK/PD Target: -600 mg/L.hr   Other Antimicrobials: Ampicillin/Sulbactam    Imaging Reviewed?: Yes    Microbiology Data     3/7 urine cx in process  3/8 MRSA PCR ordered  3/8 blood cx  ordered    Vitals/Labs  Ht: 188 cm (74\"); Wt: 77 kg (169 lb 12.1 oz)  Temp (24hrs), Av.6 °F (36.4 °C), Min:97.5 °F (36.4 °C), Max:97.6 °F (36.4 °C)   Estimated Creatinine Clearance: 69.5 mL/min (by C-G formula based on SCr of 0.8 mg/dL).       Results from last 7 days   Lab Units 24  1326 24  0330 24  0400   CREATININE mg/dL 0.80 0.80 0.92   WBC 10*3/mm3 13.71* 14.90* 12.69*     Assessment/Plan:    Vancomycin Dose:  750 mg IV every 12 hours; which provides the following predicted parameters:  AUC24,ss: 501 mg/L.hr  PAUC*: 74 %  Ctrough,ss: 16.8 mg/L  Pconc*: 33 %  Tox.: 12 %  Vanc Trough ordered for tomorrow.      Pharmacy will follow patient's kidney function and will adjust doses and obtain levels as necessary. Thank you for involving pharmacy in this patient's care. Please contact pharmacy with any questions or concerns.                           Jenna Camarillo  Clinical Pharmacist    "

## 2024-03-09 NOTE — SIGNIFICANT NOTE
I was notified that patient's daughter wished to transition to comfort care only.     I came to bedside and discussed with daughter. All questions answered. Decision made to make patient COMFORT CARE ONLY.     Code status updated and comfort medications ordered.     Electronically signed by ANNA Cardozo, 03/08/24, 9:01 PM EST.

## 2024-03-09 NOTE — PLAN OF CARE
Goal Outcome Evaluation: Comfort care continues. Pt non-verbal, withdrawal from pain, and no spontaneous eye opening. End of lift care begins. Emotional support given to daughter. Continue POC

## 2024-03-09 NOTE — PLAN OF CARE
Goal Outcome Evaluation:  Plan of Care Reviewed With: patient        Progress: declining  Outcome Evaluation: Patient in 2 point restraints,  comfort meds given, 4 bed rails up per policy, patient on 3 L NC for comfort, call light within reach, daughter present in room until after midnight.

## 2024-03-09 NOTE — NURSING NOTE
Spoke with patient daughter about getting new IV, daughter did not want a new IV and wanted to make patient comfort care,/ Informed Verenice CASTILLO of daughter's wishes,.

## 2024-03-09 NOTE — PROGRESS NOTES
[facility]   Hospitalist Progress Note  Date: 3/9/2024  Patient Name: Ish Chavarria  : 1935  MRN: [id number]  Date of admission: 3/8/2024    Subjective   Subjective     Chief Complaint: Cough    Summary:   Ish Chavarria is a 88 y.o. male with aortic stenosis, recent right acetabular fracture that was managed nonoperatively and [facility] was [facility] on [facility]24 and was sent back to ED for evaluation for coughing while eating.  Patient also had slid off of the bed and was in the floor.  Patient had increased confusion and lethargy per patient's daughter at bedside on admission.  Eval in ED significant for CXR showing right-sided infiltrates and patient with delirium.  Broad-spectrum empiric antibiotics started and patient also placed on supplemental O2 due to hypoxia.  Patient's care discussed with patient's daughter and decision was made to discontinue invasive care measures and pursue comfort care measures only.    Interval Followup:   Patient transition to comfort care measures only.  Continues on supplemental O2 for comfort.  Patient has received as needed Ativan, atropine and morphine for symptom management.    Pain Medication:   -Morphine    Objective   Objective     Vitals:   Temp:  [97.5 °F (36.4 °C)-98.2 °F (36.8 °C)] 98 °F (36.7 °C)  Heart Rate:  [70-90] 90  Resp:  [20-24] 24  BP: (117-166)/(60-98) 117/60  Flow (L/min):  [3-4] 4  Physical Exam   Gen: Acutely ill-appearing chronically ill elderly male, frail, lying in bed  Resp: Crackles on exam, equal chest rise bilaterally, normal respiratory effort  Card: RRR, No m/r/g  Abd: Soft, Nontender, Nondistended, + bowel sounds    Result Review    Result Review:  I have personally reviewed the results as below and agree with these findings:  []  Laboratory:   CMP          2024    14:42 3/5/2024    04:00 3/8/2024    03:30 3/8/2024    13:26   CMP   Glucose  103  115  117    BUN  29  31  27    Creatinine  0.92  0.80  0.80    EGFR   80.0  85.1  85.1    Sodium  140  144  144    Potassium  4.3  3.3  3.5    Chloride  99  107  110    Calcium 9.6     9.4  8.7  8.4    Total Protein  7.5   6.7    Albumin  3.8   3.1    Globulin  3.7   3.6    Total Bilirubin  1.9   2.0    Alkaline Phosphatase  73   99    AST (SGOT)  41   54    ALT (SGPT)  23   44    Albumin/Globulin Ratio  1.0   0.9    BUN/Creatinine Ratio  31.5  38.8  33.8    Anion Gap  17.0  14.0  11.4       Details          This result is from an external source.             CBC          12/24/2023    10:01 3/5/2024    04:00 3/8/2024    03:30 3/8/2024    13:26   CBC   WBC 8.82  12.69  14.90  13.71    RBC 4.77  3.46  3.35  3.24    Hemoglobin 15.3  11.1  10.8  10.5    Hematocrit 46.1  34.4  33.0  32.1    MCV 96.6  99.4  98.5  99.1    MCH 32.1  32.1  32.2  32.4    MCHC 33.2  32.3  32.7  32.7    RDW 12.8  13.2  13.9  14.0    Platelets 222  197  340  346      []  Microbiology:   []  Radiology:   []  EKG/Telemetry:    []  Cardiology/Vascular:    []  Pathology:  []  Old records:  []  Other:    Assessment & Plan   Assessment / Plan     Assessment:  Acute hypoxic respiratory failure likely secondary to aspiration pneumonia  Aspiration pneumonia  Acute metabolic encephalopathy versus delirium  Recent right acetabular fracture  Depression  Hyperlipidemia    Plan:  -Continue comfort care measures  -As needed morphine, Ativan, Haldol, atropine available for symptom management  -Supplemental O2 available for comfort  -Palliative care consulted  -Clinical course will dictate further management       Diet Order   Procedures    Diet: Regular/House; Fluid Consistency: Thin (IDDSI 0)     Dispo: Palliative care consulted  Code Status: Comfort care     Personally reviewed patients labs and imaging, discussed with patient and nurse at bedside.     Part of this note may be an electronic transcription/translation of spoken language to printed text using the Dragon dictation system.    DVT prophylaxis:  Medical and  mechanical DVT prophylaxis orders are present.        CODE STATUS:   Code Status (Patient has no pulse and is not breathing): No CPR (Do Not Attempt to Resuscitate)  Medical Interventions (Patient has pulse or is breathing): Comfort Measures        Electronically signed by Jackson Cueva MD, 03/09/24, 11:54 AM EST.

## 2024-03-09 NOTE — PLAN OF CARE
Patient has transitioned to comfort measures only. RD will sign off at this time. If further clinical nutrition services are desired, please re-consult.      Melissa Cristina RD

## 2024-03-10 NOTE — PLAN OF CARE
Goal Outcome Evaluation:           Progress: declining  Outcome Evaluation: Patient has been restless and uncomfortable during some of shift. Spoke with daughter via telephone regarding the need to try iv medications due to oral medications being ineffective, daughter was agreeable to placement of iv. IV inserted and iv medications administered with noted improvement in patient respirations and level of comfort. Daughter contacted again via telephone for update on patient condition. Patient has rested comfortably since starting iv medications. Comfort measures remain in place. Will continue plan of care.

## 2024-03-10 NOTE — PROGRESS NOTES
[facility]   Hospitalist Progress Note  Date: 3/10/2024  Patient Name: Ish Chavarria  : 1935  MRN: [id number]  Date of admission: 3/8/2024    Subjective   Subjective     Chief Complaint: Cough    Summary:   Ish Chavarria is a 88 y.o. male with aortic stenosis, recent right acetabular fracture that was managed nonoperatively and patient was sent to [facility] on 2024 and was sent back to ED for evaluation for coughing while eating.  Patient also had slid off of the bed and was in the floor.  Patient had increased confusion and lethargy per patient's daughter at bedside on admission.  Eval in ED significant for CXR showing right-sided infiltrates and patient with delirium.  Broad-spectrum empiric antibiotics started and patient also placed on supplemental O2 due to hypoxia.  Patient's care discussed with patient's daughter and decision was made to discontinue invasive care measures and pursue comfort care measures only.    Interval Followup:   No acute issues overnight.  Patient transition to IV medications from oral medications given level of discomfort per staff.  Since change patient's level of discomfort has improved greatly.    Pain Medication:   -Morphine    Objective   Objective     Vitals:   Temp:  [99.3 °F (37.4 °C)-100.9 °F (38.3 °C)] 99.3 °F (37.4 °C)  Heart Rate:  [97-99] 97  Resp:  [28-48] 30  BP: (82-95)/(57-59) 82/57  Flow (L/min):  [4] 4  Physical Exam   Gen: Frail elderly chronically ill-appearing male, lying in bed, no acute distress  Resp: Crackles on exam, no increase in work of breathing, equal chest rise bilaterally  Card: RRR, No m/r/g  Abd: Soft, Nontender, Nondistended, + bowel sounds    Result Review    Result Review:  I have personally reviewed the results as below and agree with these findings:  []  Laboratory:   CMP          2024    14:42 3/5/2024    04:00 3/8/2024    03:30 3/8/2024    13:26   CMP   Glucose  103  115  117    BUN  29  31  27    Creatinine   0.92  0.80  0.80    EGFR  80.0  85.1  85.1    Sodium  140  144  144    Potassium  4.3  3.3  3.5    Chloride  99  107  110    Calcium 9.6     9.4  8.7  8.4    Total Protein  7.5   6.7    Albumin  3.8   3.1    Globulin  3.7   3.6    Total Bilirubin  1.9   2.0    Alkaline Phosphatase  73   99    AST (SGOT)  41   54    ALT (SGPT)  23   44    Albumin/Globulin Ratio  1.0   0.9    BUN/Creatinine Ratio  31.5  38.8  33.8    Anion Gap  17.0  14.0  11.4       Details          This result is from an external source.             CBC          12/24/2023    10:01 3/5/2024    04:00 3/8/2024    03:30 3/8/2024    13:26   CBC   WBC 8.82  12.69  14.90  13.71    RBC 4.77  3.46  3.35  3.24    Hemoglobin 15.3  11.1  10.8  10.5    Hematocrit 46.1  34.4  33.0  32.1    MCV 96.6  99.4  98.5  99.1    MCH 32.1  32.1  32.2  32.4    MCHC 33.2  32.3  32.7  32.7    RDW 12.8  13.2  13.9  14.0    Platelets 222  197  340  346      []  Microbiology:   []  Radiology:   []  EKG/Telemetry:    []  Cardiology/Vascular:    []  Pathology:  []  Old records:  []  Other:    Assessment & Plan   Assessment / Plan     Assessment:  Acute hypoxic respiratory failure likely secondary to aspiration pneumonia  Aspiration pneumonia  Acute metabolic encephalopathy versus delirium  Recent right acetabular fracture  Depression  Hyperlipidemia    Plan:  -Continue comfort care measures  -As needed IV morphine and diazepam available for symptom management  -Supplemental O2 available for comfort  -Palliative care consulted  -Clinical course will dictate further management       Diet Order   Procedures    Diet: Regular/House; Fluid Consistency: Thin (IDDSI 0)     Dispo: Palliative care consulted  Code Status: Comfort care     Personally reviewed patients labs and imaging, discussed with patient and nurse at bedside.     Part of this note may be an electronic transcription/translation of spoken language to printed text using the Dragon dictation system.    DVT  prophylaxis:  Medical and mechanical DVT prophylaxis orders are present.        CODE STATUS:   Code Status (Patient has no pulse and is not breathing): No CPR (Do Not Attempt to Resuscitate)  Medical Interventions (Patient has pulse or is breathing): Comfort Measures    Electronically signed by Jackson Cueva MD, 03/10/24, 11:01 AM EDT.   No

## 2024-03-10 NOTE — DISCHARGE SUMMARY
[facility]         HOSPITALIST  DEATH SUMMARY    Patient Name: Ish Chavarria  : 1935  MRN: [id number]    Date of Admission: 3/8/2024  Time of Death:  03/10/2024 at 14:.30  Primary Care Physician: Akila Florence APRN    Hospital Problems:  Acute hypoxic respiratory failure likely secondary to aspiration pneumonia  Aspiration pneumonia  Acute metabolic encephalopathy versus delirium  Recent right acetabular fracture  Depression  Hyperlipidemia    Hospital Course     Hospital Course:  Ish Chavarria is a 88 y.o. male with aortic stenosis, recent right acetabular fracture that was managed nonoperatively and patient was sent to [facility] on 2024 and was sent back to ED for evaluation for coughing while eating. Patient also had slid off of the bed and was in the floor. Patient had increased confusion and lethargy per patient's daughter at bedside on admission. Eval in ED significant for CXR showing right-sided infiltrates and patient with delirium. Broad-spectrum empiric antibiotics started and patient also placed on supplemental O2 due to hypoxia. Patient's care discussed with patient's daughter and decision was made to discontinue invasive care measures and pursue comfort care measures only.  Time of death: 03/10/2024 at 14:30.    Pertinent  and/or Most Recent Results     RADIOLOGY:  XR Hip With or Without Pelvis 2 - 3 View Left [[id number]] Donovan as Reviewed   Order Status: Completed Collected: 24 1048    Updated: 24 1051   Narrative:     PROCEDURE: XR HIP W OR WO PELVIS 2-3 VIEW LEFT     COMPARISON: [facility], CT, CT LOWER EXTREMITY RIGHT WO CONTRAST, 2024, 15:10.    [facility], CR, XR HIP W OR WO PELVIS 2-3 VIEW RIGHT, 2024, 13:33.     INDICATIONS: GENERALIZED LEFT HIP PAIN AFTER FALL TODAY     FINDINGS:  Again seen is smooth deformity of the right acetabulum consistent with healing fractures.  No  evidence of new or acute pelvic  fracture.  Sacroiliac joints appear intact.  There is mild  narrowing of the hip joint spaces bilaterally.  No evidence of left hip fracture.     Patient is again noted be status post kyphoplasty at the L5 level.      Impression:       1. No acute pelvic or left hip fracture  2. Healing fracture of the right acetabulum               ANGLE OLIVIA MD        Electronically Signed and Approved By: ANGLE OLIVIA MD on 3/08/2024 at 10:48                   XR Chest 1 View [[id number]] Donovan as Reviewed   Order Status: Completed Collected: 03/08/24 1047    Updated: 03/08/24 1050   Narrative:     PROCEDURE: XR CHEST 1 VW     COMPARISON: [facility], CR, XR CHEST 1 VW, 12/24/2023, 9:50.     INDICATIONS: concern for aspiration     FINDINGS:  Heart size is at the upper limits of normal.  There is patchy airspace disease throughout the right  lung consistent with multifocal pneumonia.  Left lung is clear.  No pleural effusion.  No  pneumothorax.  Bony structures are unremarkable.      Impression:       1. Patchy airspace disease in the right lung consistent with multifocal pneumonia.  Follow-up to  complete radiographic resolution would be recommended.                  ANGLE OLIVIA MD        Electronically Signed and Approved By: ANGLE OLIVIA MD on 3/08/2024 at 10:46       LAB RESULTS:      Lab 03/08/24  1326 03/08/24  0330 03/05/24  0400   WBC 13.71* 14.90* 12.69*   HEMOGLOBIN 10.5* 10.8* 11.1*   HEMATOCRIT 32.1* 33.0* 34.4*   PLATELETS 346 340 197   NEUTROS ABS 11.09* 12.00* 10.79*   IMMATURE GRANS (ABS) 0.14* 0.12*  --    LYMPHS ABS 1.38 1.48  --    MONOS ABS 1.04* 1.20*  --    EOS ABS 0.02 0.05  --    MCV 99.1* 98.5* 99.4*   PROCALCITONIN 0.20  --   --    LACTATE 1.3  --   --          Lab 03/08/24  1326 03/08/24  0330 03/05/24  0400   SODIUM 144 144 140   POTASSIUM 3.5 3.3* 4.3   CHLORIDE 110* 107 99   CO2 22.6 23.0 24.0   ANION GAP 11.4 14.0 17.0*   BUN 27* 31* 29*   CREATININE 0.80 0.80 0.92   EGFR 85.1  85.1 80.0   GLUCOSE 117* 115* 103*   CALCIUM 8.4* 8.7 9.4   MAGNESIUM  --   --  2.0         Lab 03/08/24  1326 03/05/24  0400   TOTAL PROTEIN 6.7 7.5   ALBUMIN 3.1* 3.8   GLOBULIN 3.6 3.7   ALT (SGPT) 44* 23   AST (SGOT) 54* 41*   BILIRUBIN 2.0* 1.9*   ALK PHOS 99 73         Lab 03/08/24  0330 03/05/24  0400   PROBNP 568.8 806.7                 Brief Urine Lab Results  (Last result in the past 365 days)        Color   Clarity   Blood   Leuk Est   Nitrite   Protein   CREAT   Urine HCG        03/08/24 1339 Dark Yellow   Clear   Negative   Trace   Negative   30 mg/dL (1+)                 Microbiology Results (last 10 days)       Procedure Component Value - Date/Time    MRSA Screen, PCR (Inpatient) - Swab, Nares [[id number]]  (Abnormal) Collected: 03/08/24 1708    Lab Status: Final result Specimen: Swab from Nares Updated: 03/08/24 1954     MRSA PCR MRSA Detected    Narrative:      The negative predictive value of this diagnostic test is high and should only be used to consider de-escalating anti-MRSA therapy. A positive result may indicate colonization with MRSA and must be correlated clinically.    Blood Culture - Blood, Arm, Right [[id number]]  (Normal) Collected: 03/08/24 1330    Lab Status: Preliminary result Specimen: Blood from Arm, Right Updated: 03/09/24 1345     Blood Culture No growth at 24 hours    Blood Culture - Blood, Arm, Right [[id number]]  (Normal) Collected: 03/08/24 1326    Lab Status: Preliminary result Specimen: Blood from Arm, Right Updated: 03/09/24 1345     Blood Culture No growth at 24 hours    Urine Culture - Urine, Urine, Random Void [[id number]]  (Normal) Collected: 03/07/24 1400    Lab Status: Final result Specimen: Urine, Random Void Updated: 03/09/24 1556     Urine Culture No growth            Electronically signed by Jackson Cueva MD, 03/10/24, 2:39 PM EDT.

## 2024-03-11 LAB
QT INTERVAL: 374 MS
QTC INTERVAL: 443 MS

## 2024-03-13 LAB
BACTERIA SPEC AEROBE CULT: NORMAL
BACTERIA SPEC AEROBE CULT: NORMAL

## 2024-03-20 NOTE — PROGRESS NOTES
"Enter Query Response Below      Query Response: Acute respiratory failure due to aspiration pneumonia and encephalopathy due to sepsis      Electronically signed by Kimo Contreras MD, 24, 1:39 PM EDT.         If applicable, please update the problem list.   Patient: Ish Chavarria        : 1935  Account: [id number]           Admit Date: 3/8/2024        How to Respond to this query:       a. Click New Note     b. Answer query within the yellow box.                c. Update the Problem List, if applicable.    If you have any questions about this query contact me at: [email or web address]     Dr. Contreras,    89 yo male admitted 3/8/24 per H&P for \"aspiration pneumonia, AHRF, encephalopathy and sepsis.\"  Risk Factors: PMH includes aortic stenosis and recent R acetabular fracture.  Clinical indicators: Lab values reported as follows: (3/8/24) WBC 13.71, platelets 346, procalcitonin 0.20, lactate 1.3, Creatinine 0.80, total bilirubin 2.0 and MRSA PCR screen \"detected.\"  Vital signs reported upon presentation: SpO2 84% on O2 @ 2 lpm/nc, heart rate 66, respiratory rate 20 /84 and temp 97.6.  Discharge summary \"Acute hypoxic respiratory failure likely secondary to aspiration pneumonia and acute metabolic encephalopathy versus delirium.  Treatment: IV normal saline bolus 1 liter 3/8/24 and transition to Lactated Ringer 100 mLs/hr, IV Vancomycin and Ampicillin 3/8/24. Transitioned to palliative care.     Please clarify the following:    Acute respiratory failure and encephalopathy due to sepsis  Acute respiratory failure and encephalopathy due to aspiration pneumonia, sepsis ruled out  Other- specify_______  Unable to determine    By submitting this query, we are merely seeking further clarification of documentation to accurately reflect all conditions that you are monitoring, evaluating, treating or that extend the hospitalization or utilize additional resources of care. Please utilize your " independent clinical judgment when addressing the question(s) above.     This query and your response, once completed, will be entered into the legal medical record.    Sincerely,  Rachel MORALES RN CCDS  System Director Clinical Documentation Integrity Program